# Patient Record
Sex: FEMALE
[De-identification: names, ages, dates, MRNs, and addresses within clinical notes are randomized per-mention and may not be internally consistent; named-entity substitution may affect disease eponyms.]

---

## 2018-09-15 ENCOUNTER — CHARTING TRANS (OUTPATIENT)
Dept: OTHER | Age: 58
End: 2018-09-15

## 2018-09-15 ENCOUNTER — LAB SERVICES (OUTPATIENT)
Dept: OTHER | Age: 58
End: 2018-09-15

## 2018-09-15 LAB
APPEARANCE: CLEAR
BILIRUBIN: NORMAL
COLOR: YELLOW
GLUCOSE U: NORMAL
KETONES: NORMAL
LEUKOCYTE ESTERASE: NORMAL
LEUKOCYTES: NORMAL
NITRITE: NORMAL
OCCULT BLOOD: NORMAL
PH: 5
PROTEIN: 15
URINE SPEC GRAVITY: 1
UROBILINOGEN: 0.2

## 2018-09-20 LAB — BACTERIA UR CULT: NORMAL

## 2018-12-08 VITALS
BODY MASS INDEX: 35.44 KG/M2 | TEMPERATURE: 98.6 F | RESPIRATION RATE: 16 BRPM | HEART RATE: 70 BPM | SYSTOLIC BLOOD PRESSURE: 114 MMHG | DIASTOLIC BLOOD PRESSURE: 70 MMHG | HEIGHT: 63 IN | WEIGHT: 200 LBS

## 2019-07-10 ENCOUNTER — APPOINTMENT (OUTPATIENT)
Dept: GENERAL RADIOLOGY | Age: 59
End: 2019-07-10
Attending: FAMILY MEDICINE
Payer: COMMERCIAL

## 2019-07-10 ENCOUNTER — HOSPITAL ENCOUNTER (OUTPATIENT)
Age: 59
Discharge: HOME | End: 2019-07-10
Attending: FAMILY MEDICINE
Payer: COMMERCIAL

## 2019-07-10 VITALS
WEIGHT: 193 LBS | OXYGEN SATURATION: 100 % | DIASTOLIC BLOOD PRESSURE: 70 MMHG | HEART RATE: 84 BPM | HEIGHT: 64 IN | TEMPERATURE: 98 F | RESPIRATION RATE: 17 BRPM | SYSTOLIC BLOOD PRESSURE: 143 MMHG | BODY MASS INDEX: 32.95 KG/M2

## 2019-07-10 DIAGNOSIS — S80.01XA CONTUSION OF RIGHT KNEE, INITIAL ENCOUNTER: ICD-10-CM

## 2019-07-10 DIAGNOSIS — S63.501A SPRAIN OF RIGHT WRIST, INITIAL ENCOUNTER: Primary | ICD-10-CM

## 2019-07-10 DIAGNOSIS — S60.221A CONTUSION OF RIGHT HAND, INITIAL ENCOUNTER: ICD-10-CM

## 2019-07-10 DIAGNOSIS — S00.511A ABRASION OF LIP, INITIAL ENCOUNTER: ICD-10-CM

## 2019-07-10 DIAGNOSIS — S60.222A CONTUSION OF LEFT HAND, INITIAL ENCOUNTER: ICD-10-CM

## 2019-07-10 DIAGNOSIS — S63.502A SPRAIN OF LEFT WRIST, INITIAL ENCOUNTER: ICD-10-CM

## 2019-07-10 DIAGNOSIS — S80.212A ABRASION OF LEFT KNEE, INITIAL ENCOUNTER: ICD-10-CM

## 2019-07-10 PROCEDURE — 73110 X-RAY EXAM OF WRIST: CPT | Performed by: FAMILY MEDICINE

## 2019-07-10 PROCEDURE — 73130 X-RAY EXAM OF HAND: CPT | Performed by: FAMILY MEDICINE

## 2019-07-10 PROCEDURE — 99204 OFFICE O/P NEW MOD 45 MIN: CPT

## 2019-07-10 PROCEDURE — 73560 X-RAY EXAM OF KNEE 1 OR 2: CPT | Performed by: FAMILY MEDICINE

## 2019-07-10 RX ORDER — LIRAGLUTIDE 6 MG/ML
INJECTION SUBCUTANEOUS
Refills: 3 | COMMUNITY
Start: 2019-05-15

## 2019-07-10 RX ORDER — METFORMIN HYDROCHLORIDE 500 MG/1
TABLET, EXTENDED RELEASE ORAL
Refills: 3 | COMMUNITY
Start: 2019-06-29

## 2019-07-10 RX ORDER — AMLODIPINE BESYLATE 10 MG/1
TABLET ORAL
Refills: 0 | COMMUNITY
Start: 2019-05-09

## 2019-07-10 RX ORDER — INSULIN GLARGINE 100 [IU]/ML
INJECTION, SOLUTION SUBCUTANEOUS
Refills: 2 | COMMUNITY
Start: 2019-06-24

## 2019-07-10 RX ORDER — LOSARTAN POTASSIUM 100 MG/1
TABLET ORAL
Refills: 0 | COMMUNITY
Start: 2019-05-09

## 2019-07-10 RX ORDER — PEN NEEDLE, DIABETIC 31 GX5/16"
NEEDLE, DISPOSABLE MISCELLANEOUS
Refills: 5 | COMMUNITY
Start: 2019-05-14

## 2019-07-10 RX ORDER — ERGOCALCIFEROL 1.25 MG/1
CAPSULE ORAL
Refills: 1 | COMMUNITY
Start: 2019-05-16

## 2019-07-10 NOTE — ED PROVIDER NOTES
Patient Seen in: [address]    History   Patient presents with:  Fall (musculoskeletal, neurologic)    Stated Complaint: Fall, Facial Injury both hands     HPI    59yo F with a PMHx sig for DM2 and HTN presents to IC aft Mouth/Throat: Oropharynx is clear and moist.   Lower lip with superficial abrasion < 2cm. Hemostatic. No foreign bodies or deep structures. Mild swelling. Eyes: Pupils are equal, round, and reactive to light.  Conjunctivae are normal.   Neck: Normal ra Normal sensation noted. Normal strength noted. Left hand: She exhibits tenderness (2,3 metacarpals) and swelling (diffuse).  She exhibits normal range of motion, normal two-point discrimination, normal capillary refill, no deformity and no laceration Prescribed:  Current Discharge Medication List

## 2019-07-10 NOTE — ED INITIAL ASSESSMENT (HPI)
Per pt tripped and fell in the parking lot, reports lip injury and landed on hands and knees. Denies any LOC. Abrasion and swelling to bilateral knee.

## 2020-02-27 ENCOUNTER — OFFICE VISIT (OUTPATIENT)
Dept: FAMILY MEDICINE CLINIC | Facility: CLINIC | Age: 60
End: 2020-02-27
Payer: COMMERCIAL

## 2020-02-27 ENCOUNTER — HOSPITAL ENCOUNTER (OUTPATIENT)
Dept: GENERAL RADIOLOGY | Age: 60
Discharge: HOME | End: 2020-02-27
Attending: FAMILY MEDICINE
Payer: COMMERCIAL

## 2020-02-27 ENCOUNTER — APPOINTMENT (OUTPATIENT)
Dept: LAB | Age: 60
End: 2020-02-27
Attending: FAMILY MEDICINE
Payer: COMMERCIAL

## 2020-02-27 VITALS
BODY MASS INDEX: 33.29 KG/M2 | SYSTOLIC BLOOD PRESSURE: 138 MMHG | HEIGHT: 64 IN | WEIGHT: 195 LBS | RESPIRATION RATE: 17 BRPM | DIASTOLIC BLOOD PRESSURE: 70 MMHG

## 2020-02-27 DIAGNOSIS — R20.9 SKIN SENSATION DISTURBANCE: ICD-10-CM

## 2020-02-27 DIAGNOSIS — E11.9 TYPE 2 DIABETES MELLITUS WITHOUT COMPLICATION, WITHOUT LONG-TERM CURRENT USE OF INSULIN (HCC): ICD-10-CM

## 2020-02-27 DIAGNOSIS — I10 ESSENTIAL HYPERTENSION: ICD-10-CM

## 2020-02-27 DIAGNOSIS — G62.9 PERIPHERAL POLYNEUROPATHY: ICD-10-CM

## 2020-02-27 DIAGNOSIS — Z76.89 ESTABLISHING CARE WITH NEW DOCTOR, ENCOUNTER FOR: Primary | ICD-10-CM

## 2020-02-27 LAB
ALBUMIN SERPL-MCNC: 4 G/DL (ref 3.4–5)
ALBUMIN/GLOB SERPL: 1 {RATIO} (ref 1–2)
ALP LIVER SERPL-CCNC: 96 U/L (ref 46–118)
ALT SERPL-CCNC: 48 U/L (ref 13–56)
ANION GAP SERPL CALC-SCNC: 8 MMOL/L (ref 0–18)
AST SERPL-CCNC: 25 U/L (ref 15–37)
BILIRUB SERPL-MCNC: 0.2 MG/DL (ref 0.1–2)
BUN BLD-MCNC: 9 MG/DL (ref 7–18)
BUN/CREAT SERPL: 14.5 (ref 10–20)
CALCIUM BLD-MCNC: 9.4 MG/DL (ref 8.5–10.1)
CHLORIDE SERPL-SCNC: 104 MMOL/L (ref 98–112)
CHOLEST SMN-MCNC: 191 MG/DL (ref ?–200)
CO2 SERPL-SCNC: 26 MMOL/L (ref 21–32)
CREAT BLD-MCNC: 0.62 MG/DL (ref 0.55–1.02)
EST. AVERAGE GLUCOSE BLD GHB EST-MCNC: 177 MG/DL (ref 68–126)
GLOBULIN PLAS-MCNC: 4.1 G/DL (ref 2.8–4.4)
GLUCOSE BLD-MCNC: 161 MG/DL (ref 70–99)
HBA1C MFR BLD HPLC: 7.8 % (ref ?–5.7)
HDLC SERPL-MCNC: 48 MG/DL (ref 40–59)
LDLC SERPL CALC-MCNC: 86 MG/DL (ref ?–100)
M PROTEIN MFR SERPL ELPH: 8.1 G/DL (ref 6.4–8.2)
NONHDLC SERPL-MCNC: 143 MG/DL (ref ?–130)
OSMOLALITY SERPL CALC.SUM OF ELEC: 288 MOSM/KG (ref 275–295)
PATIENT FASTING Y/N/NP: NO
PATIENT FASTING Y/N/NP: NO
POTASSIUM SERPL-SCNC: 4.1 MMOL/L (ref 3.5–5.1)
PROT UR-MCNC: 16.9 MG/DL
SODIUM SERPL-SCNC: 138 MMOL/L (ref 136–145)
TRIGL SERPL-MCNC: 285 MG/DL (ref 30–149)
VLDLC SERPL CALC-MCNC: 57 MG/DL (ref 0–30)

## 2020-02-27 PROCEDURE — 84156 ASSAY OF PROTEIN URINE: CPT | Performed by: FAMILY MEDICINE

## 2020-02-27 PROCEDURE — 83036 HEMOGLOBIN GLYCOSYLATED A1C: CPT | Performed by: FAMILY MEDICINE

## 2020-02-27 PROCEDURE — 80053 COMPREHEN METABOLIC PANEL: CPT | Performed by: FAMILY MEDICINE

## 2020-02-27 PROCEDURE — 36415 COLL VENOUS BLD VENIPUNCTURE: CPT | Performed by: FAMILY MEDICINE

## 2020-02-27 PROCEDURE — 72050 X-RAY EXAM NECK SPINE 4/5VWS: CPT | Performed by: FAMILY MEDICINE

## 2020-02-27 PROCEDURE — 80061 LIPID PANEL: CPT | Performed by: FAMILY MEDICINE

## 2020-02-27 PROCEDURE — 99204 OFFICE O/P NEW MOD 45 MIN: CPT | Performed by: FAMILY MEDICINE

## 2020-02-27 NOTE — PATIENT INSTRUCTIONS
We will redraw the patient's A1c on today. Medication reviewed and renewed where needed and appropriate. Comply with medications. Monitor blood pressures and record at home. Limit salt intake.   Recommend weight loss via daily exercising and consistent h

## 2020-02-28 NOTE — PROGRESS NOTES
HPI:    Patient ID: William Saunders is a 63year old female.     This patient is a 52-year-old -American female who presents to our clinic to establish care with a history of diabetes currently on a 3 medical regimen for treatment as well as being tr Effort normal and breath sounds normal. No respiratory distress. Neurological: She is alert and oriented to person, place, and time. No cranial nerve deficit or motor deficit. ASSESSMENT/PLAN:   1.  Establishing care with new doctor, vani #[id number]

## 2020-04-20 ENCOUNTER — PATIENT MESSAGE (OUTPATIENT)
Dept: FAMILY MEDICINE CLINIC | Facility: CLINIC | Age: 60
End: 2020-04-20

## 2020-04-20 DIAGNOSIS — M48.02 FORAMINAL STENOSIS OF CERVICAL REGION: Primary | ICD-10-CM

## 2020-04-21 NOTE — TELEPHONE ENCOUNTER
From: Flaca England  To: Kathy Amaya DO  Sent: 4/20/2020 7:08 PM CDT  Subject: Test Results Question    Dr. Geronimo Archibald,    Based on the results from the x-rays showing stenosis is that the cause the numbness in my extremities.  It has gotten progre

## 2020-04-21 NOTE — TELEPHONE ENCOUNTER
This is likely the reason for your symptoms. You are going to need to see a physiatry specialist.  A referral is on the chart.

## 2020-07-01 ENCOUNTER — TELEMEDICINE (OUTPATIENT)
Dept: NEUROLOGY | Facility: CLINIC | Age: 60
End: 2020-07-01
Payer: COMMERCIAL

## 2020-07-01 ENCOUNTER — TELEPHONE (OUTPATIENT)
Dept: NEUROLOGY | Facility: CLINIC | Age: 60
End: 2020-07-01

## 2020-07-01 DIAGNOSIS — G47.9 SLEEP DISTURBANCE: ICD-10-CM

## 2020-07-01 DIAGNOSIS — E11.9 TYPE 2 DIABETES MELLITUS WITHOUT COMPLICATION, UNSPECIFIED WHETHER LONG TERM INSULIN USE (HCC): ICD-10-CM

## 2020-07-01 DIAGNOSIS — R20.0 NUMBNESS AND TINGLING IN BOTH HANDS: ICD-10-CM

## 2020-07-01 DIAGNOSIS — R29.3 POOR POSTURE: ICD-10-CM

## 2020-07-01 DIAGNOSIS — M54.12 CERVICAL RADICULOPATHY: Primary | ICD-10-CM

## 2020-07-01 DIAGNOSIS — R29.898 BILATERAL ARM WEAKNESS: ICD-10-CM

## 2020-07-01 DIAGNOSIS — R20.2 NUMBNESS AND TINGLING IN BOTH HANDS: ICD-10-CM

## 2020-07-01 PROCEDURE — 99244 OFF/OP CNSLTJ NEW/EST MOD 40: CPT | Performed by: PHYSICAL MEDICINE & REHABILITATION

## 2020-07-01 RX ORDER — GABAPENTIN 300 MG/1
CAPSULE ORAL
Qty: 90 CAPSULE | Refills: 0 | Status: SHIPPED | OUTPATIENT
Start: 2020-07-01 | End: 2020-08-04

## 2020-07-01 RX ORDER — GABAPENTIN 300 MG/1
CAPSULE ORAL
Qty: 270 CAPSULE | Refills: 0 | OUTPATIENT
Start: 2020-07-01

## 2020-07-01 RX ORDER — MELOXICAM 15 MG/1
15 TABLET ORAL DAILY
Qty: 14 TABLET | Refills: 0 | Status: SHIPPED | OUTPATIENT
Start: 2020-07-01 | End: 2020-08-12

## 2020-07-01 NOTE — TELEPHONE ENCOUNTER
[employer] for authorization of approval for MRI C-spine wo cpt code 24499. Approval was given with Authorization Number: [id number] effective 07/01/20 to 12/28/20. MRI is scheduled on 07/07/20.

## 2020-07-01 NOTE — PROGRESS NOTES
[address]    Telemedicine Visit - New Evaluation    Telehealth outside of [address] Verbal Consent   I conducted a telehealth visit with Adela Mcgovern today, 07/01/20, which was completed using being evaluated for bilateral arm and hand pain as well as numbness tingling sensation bilateral arms and legs. Patient denies any recent injury or trauma.   She has been experiencing the symptoms since February which has exacerbated her symptoms since the Medication Sig Dispense Refill   • gabapentin 300 MG Oral Cap Start with night time dose only. If well tolerated, increase to two times daily. If well tolerated, increase to three times daily.  90 capsule 0   • Meloxicam (MOBIC) 15 MG Oral Tab Take 1 tabl Atraumatic  Eyes: Extra-occular movements intact  Ears/Nose/Throat:  External appearance identifies normal appearance without obvious deformity  Cardiovascular: No cyanosis, clubbing or edema  Respiratory: Non-labored respirations  Skin: No lesions noted flexors)  • Sensation – please run your hand down the other arm, then do the other side. Any numb spots? How about in your hand? • Spurling's – please turn your head to the side then look back – any shooting pain down the arm or into the shoulder? strength but has some weakness with  strength in the left hand. She has a positive Tinel's in the left hand as well.   Given these findings in the bilateral upper and lower extremities, I would like to obtain MRI imaging of the cervical spine for stevo answered. Duration of the service: 16 minutes    Dionne PEARCE [facility]  Physical Medicine and Rehabilitation/Sports Medicine

## 2020-07-07 ENCOUNTER — HOSPITAL ENCOUNTER (OUTPATIENT)
Dept: MRI IMAGING | Facility: HOSPITAL | Age: 60
Discharge: HOME | End: 2020-07-07
Attending: PHYSICAL MEDICINE & REHABILITATION
Payer: COMMERCIAL

## 2020-07-07 DIAGNOSIS — R20.0 NUMBNESS AND TINGLING IN BOTH HANDS: ICD-10-CM

## 2020-07-07 DIAGNOSIS — R20.2 NUMBNESS AND TINGLING IN BOTH HANDS: ICD-10-CM

## 2020-07-07 DIAGNOSIS — M54.12 CERVICAL RADICULOPATHY: ICD-10-CM

## 2020-07-07 DIAGNOSIS — R29.898 BILATERAL ARM WEAKNESS: ICD-10-CM

## 2020-07-07 PROCEDURE — 72141 MRI NECK SPINE W/O DYE: CPT | Performed by: PHYSICAL MEDICINE & REHABILITATION

## 2020-07-08 ENCOUNTER — TELEMEDICINE (OUTPATIENT)
Dept: NEUROLOGY | Facility: CLINIC | Age: 60
End: 2020-07-08

## 2020-07-08 ENCOUNTER — TELEPHONE (OUTPATIENT)
Dept: NEUROLOGY | Facility: CLINIC | Age: 60
End: 2020-07-08

## 2020-07-08 DIAGNOSIS — G95.9 CERVICAL MYELOPATHY WITH CERVICAL RADICULOPATHY (HCC): Primary | ICD-10-CM

## 2020-07-08 DIAGNOSIS — R20.2 NUMBNESS AND TINGLING IN BOTH HANDS: ICD-10-CM

## 2020-07-08 DIAGNOSIS — M48.02 CERVICAL SPINAL STENOSIS: ICD-10-CM

## 2020-07-08 DIAGNOSIS — E11.9 TYPE 2 DIABETES MELLITUS WITHOUT COMPLICATION, UNSPECIFIED WHETHER LONG TERM INSULIN USE (HCC): ICD-10-CM

## 2020-07-08 DIAGNOSIS — M50.00 HNP (HERNIATED NUCLEUS PULPOSUS) WITH MYELOPATHY, CERVICAL: ICD-10-CM

## 2020-07-08 DIAGNOSIS — R20.0 NUMBNESS AND TINGLING IN BOTH HANDS: ICD-10-CM

## 2020-07-08 DIAGNOSIS — R29.898 BILATERAL ARM WEAKNESS: ICD-10-CM

## 2020-07-08 DIAGNOSIS — M54.12 CERVICAL MYELOPATHY WITH CERVICAL RADICULOPATHY (HCC): Primary | ICD-10-CM

## 2020-07-08 DIAGNOSIS — G56.02 CARPAL TUNNEL SYNDROME OF LEFT WRIST: ICD-10-CM

## 2020-07-08 PROCEDURE — 99214 OFFICE O/P EST MOD 30 MIN: CPT | Performed by: PHYSICAL MEDICINE & REHABILITATION

## 2020-07-09 PROBLEM — R20.2 NUMBNESS AND TINGLING IN BOTH HANDS: Status: ACTIVE | Noted: 2020-07-09

## 2020-07-09 PROBLEM — M50.00 HNP (HERNIATED NUCLEUS PULPOSUS) WITH MYELOPATHY, CERVICAL: Status: ACTIVE | Noted: 2020-07-09

## 2020-07-09 PROBLEM — R20.0 NUMBNESS AND TINGLING IN BOTH HANDS: Status: ACTIVE | Noted: 2020-07-09

## 2020-07-09 PROBLEM — G95.9 CERVICAL MYELOPATHY WITH CERVICAL RADICULOPATHY: Status: ACTIVE | Noted: 2020-07-09

## 2020-07-09 PROBLEM — G95.9 CERVICAL MYELOPATHY WITH CERVICAL RADICULOPATHY (HCC): Status: ACTIVE | Noted: 2020-07-09

## 2020-07-09 PROBLEM — M54.12 CERVICAL MYELOPATHY WITH CERVICAL RADICULOPATHY  (HCC): Status: ACTIVE | Noted: 2020-07-09

## 2020-07-09 PROBLEM — M48.02 CERVICAL SPINAL STENOSIS: Status: ACTIVE | Noted: 2020-07-09

## 2020-07-09 PROBLEM — M54.12 CERVICAL MYELOPATHY WITH CERVICAL RADICULOPATHY: Status: ACTIVE | Noted: 2020-07-09

## 2020-07-09 PROBLEM — G95.9 CERVICAL MYELOPATHY WITH CERVICAL RADICULOPATHY  (HCC): Status: ACTIVE | Noted: 2020-07-09

## 2020-07-09 PROBLEM — E11.9 TYPE 2 DIABETES MELLITUS WITHOUT COMPLICATION (HCC): Status: ACTIVE | Noted: 2020-07-09

## 2020-07-09 PROBLEM — G56.02 CARPAL TUNNEL SYNDROME OF LEFT WRIST: Status: ACTIVE | Noted: 2020-07-09

## 2020-07-09 PROBLEM — R29.898 BILATERAL ARM WEAKNESS: Status: ACTIVE | Noted: 2020-07-09

## 2020-07-09 PROBLEM — M54.12 CERVICAL MYELOPATHY WITH CERVICAL RADICULOPATHY (HCC): Status: ACTIVE | Noted: 2020-07-09

## 2020-07-09 NOTE — PROGRESS NOTES
[address]    Telemedicine Visit - New Evaluation    Telehealth outside of [address] Verbal Consent   I conducted a telehealth visit with Marilyn Horan today, 07/08/20, which was completed usin cervical spine which is noted below. MRI is notable for multilevel herniated cervical discs with the worst at C5-6 which is causing cord impingement and signal changes in the spinal cord suggestive of cervical myelopathy.   There is severe central narrowin extremities worse on the left lower extremity. She had a fall which she describes as a mechanical fall however she thinks it was related to the imbalance. She denies any loss of bowel bladder control though does have some urine urgency.   She denies any f 1 T PO D  0   • metFORMIN HCl  MG Oral Tablet 24 Hr TK 2 TS PO BID  3   • ergocalciferol 38669 units Oral Cap TK 1 C PO Q WK  1         ALLERGIES:     Sulfa Antibiotics       ANAPHYLAXIS    Comment:[facility] Allergy Text Annotation: sulfa drugs      FAM strength in the left hand compared to the right   sensation: Self-assessment to crude touch is intact in bilateral upper extremities   Spurling's test: negative for radicular pain symptoms   Gait Normal   Tinel's at the left wrist is positive    Patient wa GFRNAA 100 02/27/2020    GFRAA 115 02/27/2020    CA 9.4 02/27/2020    OSMOCALC 288 02/27/2020    ALKPHO 96 02/27/2020    AST 25 02/27/2020    ALT 48 02/27/2020    BILT 0.2 02/27/2020    TP 8.1 02/27/2020    ALB 4.0 02/27/2020    GLOBULIN 4.1 02/27/2020 spine was reviewed which is notable for multilevel degenerative disc disease and spondylosis and disc herniations at C3-4, C4-5 and C5-6 and multilevel spinal stenosis which is most severe at C5-6 with 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 The patient verbalized understanding with this plan and was in agreement. There are no barriers to learning. All questions were answered. Duration of the service: 24 minutes    Felix Lozoya

## 2020-07-15 ENCOUNTER — TELEPHONE (OUTPATIENT)
Dept: NEUROLOGY | Facility: CLINIC | Age: 60
End: 2020-07-15

## 2020-07-15 ENCOUNTER — PROCEDURE VISIT (OUTPATIENT)
Dept: NEUROLOGY | Facility: CLINIC | Age: 60
End: 2020-07-15
Payer: COMMERCIAL

## 2020-07-15 DIAGNOSIS — R20.2 NUMBNESS AND TINGLING IN BOTH HANDS: Primary | ICD-10-CM

## 2020-07-15 DIAGNOSIS — R20.0 NUMBNESS AND TINGLING IN BOTH HANDS: Primary | ICD-10-CM

## 2020-07-15 PROCEDURE — 95911 NRV CNDJ TEST 9-10 STUDIES: CPT | Performed by: PHYSICAL MEDICINE & REHABILITATION

## 2020-07-15 PROCEDURE — 95886 MUSC TEST DONE W/N TEST COMP: CPT | Performed by: PHYSICAL MEDICINE & REHABILITATION

## 2020-07-17 NOTE — PROCEDURES
[address]   Nerve Conduction Study and Electromyography Procedure Note    Patient: rosenda torres Hand Dominance:  right   Patient ID: [id number] Referring Dr:  Scott Madera   Sex: Female Test Dr:  amanda   Date of L. Flexor carpi radialis Median C6-C7 N 2+ 1+ None None Normal N N N Reduced   L. Flexor carpi ulnaris Ulnar C7-T1 N 1+ None None None Normal N N N Reduced   L. Abductor pollicis brevis Median X8-L3 N None None None None Normal N N N N   L.  First dorsal in o the peak amplitude was reduced for Wrist stimulation  ? In the L Radial - Wrist study  o the peak amplitude was reduced for Forearm stimulation      Conclusion: This is an ABNORMAL study.     1. There is electrodiagnostic evidence of a bilateral median ne

## 2020-08-04 NOTE — TELEPHONE ENCOUNTER
Spoke to patient, state that she is up to 2 capsules daily. Expressed that she is working up to the 3 times daily. Medication request:Gabapentin 300 mg oral cap. Start with night time dose only. If tolerated increase to two times  Daily.  If well tolera

## 2020-08-05 RX ORDER — GABAPENTIN 300 MG/1
CAPSULE ORAL
Qty: 90 CAPSULE | Refills: 0 | Status: SHIPPED | OUTPATIENT
Start: 2020-08-14 | End: 2020-09-10

## 2020-08-11 ENCOUNTER — TELEPHONE (OUTPATIENT)
Dept: FAMILY MEDICINE CLINIC | Facility: CLINIC | Age: 60
End: 2020-08-11

## 2020-08-11 ENCOUNTER — PATIENT MESSAGE (OUTPATIENT)
Dept: FAMILY MEDICINE CLINIC | Facility: CLINIC | Age: 60
End: 2020-08-11

## 2020-08-11 NOTE — TELEPHONE ENCOUNTER
----- Message from Anna Stoddard sent at 8/11/2020  4:08 PM CDT -----  Regarding: Non-Urgent Medical Question  Contact: [phone]  Dr. Radha Stapleton,   I went to Dr Edyta Felix per your referral and was diagnosed with carpel tunnel syndrome in addition to art

## 2020-08-11 NOTE — TELEPHONE ENCOUNTER
From: Brandy England  To:  Jen Ellis DO  Sent: 8/11/2020 4:08 PM CDT  Subject: Non-Urgent Medical Question    Dr. Abida Laboy,   I went to Dr aGetano Samson per your referral and was diagnosed with carpel tunnel syndrome in addition to arthritis in my fin

## 2020-08-11 NOTE — TELEPHONE ENCOUNTER
Jaqueline Robert: Patient on gabapentin. She would like medication she can take for pain/stiffness. Left Hand/fingers painful, unable to close ring and 4th finger due to arthritic type pain. Pain rated 8/10.

## 2020-08-12 RX ORDER — MELOXICAM 15 MG/1
15 TABLET ORAL DAILY
Qty: 14 TABLET | Refills: 0 | Status: SHIPPED | OUTPATIENT
Start: 2020-08-12 | End: 2020-08-26

## 2020-08-12 NOTE — TELEPHONE ENCOUNTER
It looks like she was prescribed meloxicam 15 mg on 7/1/2020. I recommend taking this every day along with gabapentin. If this is not effective then recommend she contact Dr. Kathryn Clemente for additional treatment.

## 2020-08-12 NOTE — TELEPHONE ENCOUNTER
Spoke with pt and MD message below given. Pt verb understanding. Pt states she is out of meloxicam.  Pt asking for refill. Please advise, last prescribed by Dr Bridgett Ayala.

## 2020-09-04 ENCOUNTER — TELEPHONE (OUTPATIENT)
Dept: FAMILY MEDICINE CLINIC | Facility: CLINIC | Age: 60
End: 2020-09-04

## 2020-09-04 DIAGNOSIS — M79.646 PAIN OF FINGER, UNSPECIFIED LATERALITY: Primary | ICD-10-CM

## 2020-09-04 DIAGNOSIS — Z79.4 TYPE 2 DIABETES MELLITUS WITHOUT COMPLICATION, WITH LONG-TERM CURRENT USE OF INSULIN (HCC): ICD-10-CM

## 2020-09-04 DIAGNOSIS — E11.9 TYPE 2 DIABETES MELLITUS WITHOUT COMPLICATION, WITHOUT LONG-TERM CURRENT USE OF INSULIN (HCC): ICD-10-CM

## 2020-09-04 DIAGNOSIS — E11.9 TYPE 2 DIABETES MELLITUS WITHOUT COMPLICATION, WITH LONG-TERM CURRENT USE OF INSULIN (HCC): ICD-10-CM

## 2020-09-04 NOTE — TELEPHONE ENCOUNTER
Also sent to Dr. Robert Mosqueda (on call)--please see below and advise    Please reply to pool: ANGELA Mann

## 2020-09-04 NOTE — TELEPHONE ENCOUNTER
Spoke with patient ( verified)-- reports persistent pain 8/10 on her two arthritic fingers, despite completing 2 rounds of Meloxicam.    Patient asking PCP for recommendations/next steps.     Pharmacy verified    Patient also requesting order and approva

## 2020-09-05 RX ORDER — BLOOD-GLUCOSE SENSOR
EACH MISCELLANEOUS
Refills: 0 | Status: CANCELLED | OUTPATIENT
Start: 2020-09-05

## 2020-09-05 NOTE — TELEPHONE ENCOUNTER
I did a referral to our Hand Specialist- Dr. Suzzanna Severin. He may be able to help her. What is Dexcom? I am not familiar with this?

## 2020-09-05 NOTE — TELEPHONE ENCOUNTER
Spoke with pt and Dr Kiran Miller message given. Pt verb understanding and agrees to plan. Pt given contact information for Dr Lexy Root.      Pt states Dexcom 6 is similar to Goel freestyle and enables her to check blood sugar without multiple finger sti

## 2020-09-05 NOTE — TELEPHONE ENCOUNTER
OK great. I am going to send the order for Dexcom to [facility] as he sees him for diabetes.

## 2020-09-07 RX ORDER — BLOOD-GLUCOSE SENSOR
1 EACH MISCELLANEOUS 4 TIMES DAILY PRN
Qty: 3 EACH | Refills: 11 | Status: SHIPPED | OUTPATIENT
Start: 2020-09-07

## 2020-09-07 RX ORDER — BLOOD-GLUCOSE,RECEIVER,CONT
1 EACH MISCELLANEOUS 4 TIMES DAILY PRN
Qty: 1 DEVICE | Refills: 0 | Status: SHIPPED | OUTPATIENT
Start: 2020-09-07

## 2020-09-08 RX ORDER — GABAPENTIN 300 MG/1
CAPSULE ORAL
Qty: 90 CAPSULE | Refills: 0 | OUTPATIENT
Start: 2020-09-08

## 2020-09-08 NOTE — TELEPHONE ENCOUNTER
Spoke with patient (verified name and ), advised Dr Dalton Martinez note and verbalized understanding. Note      Device and sensors sent to the pharmacy; please call the patient and let her know.

## 2020-09-09 ENCOUNTER — PATIENT MESSAGE (OUTPATIENT)
Dept: FAMILY MEDICINE CLINIC | Facility: CLINIC | Age: 60
End: 2020-09-09

## 2020-09-09 NOTE — TELEPHONE ENCOUNTER
From: Yue England  To: Parul Calloway DO  Sent: 9/9/2020 10:00 AM CDT  Subject: Prescription Question    Dr. Timi Mercer prescribed the gabapentin 300mg and it was denied. Did you discontinue it or was there a mix up somewhere?

## 2020-09-09 NOTE — TELEPHONE ENCOUNTER
Routed to Dr Arben Natarajan for advise, thanks. Requested Prescriptions     Pending Prescriptions Disp Refills   • gabapentin 300 MG Oral Cap 90 capsule 0     Sig: TAKE 1 CAPSULES BY MOUTH EVERY NIGHT AT BEDTIME, INCREASE TO TWICE DAILY AS TOLERATED.  INCREASE

## 2020-09-10 RX ORDER — GABAPENTIN 300 MG/1
CAPSULE ORAL
Qty: 90 CAPSULE | Refills: 0 | Status: SHIPPED | OUTPATIENT
Start: 2020-09-10 | End: 2020-12-31

## 2020-09-24 ENCOUNTER — OFFICE VISIT (OUTPATIENT)
Dept: SURGERY | Facility: CLINIC | Age: 60
End: 2020-09-24
Payer: COMMERCIAL

## 2020-09-24 DIAGNOSIS — M19.041 PRIMARY OSTEOARTHRITIS OF RIGHT HAND: ICD-10-CM

## 2020-09-24 DIAGNOSIS — G56.03 BILATERAL CARPAL TUNNEL SYNDROME: Primary | ICD-10-CM

## 2020-09-24 DIAGNOSIS — G56.22 ULNAR NEUROPATHY OF LEFT UPPER EXTREMITY: ICD-10-CM

## 2020-09-24 DIAGNOSIS — M19.042 PRIMARY OSTEOARTHRITIS OF LEFT HAND: ICD-10-CM

## 2020-09-24 PROCEDURE — 99243 OFF/OP CNSLTJ NEW/EST LOW 30: CPT | Performed by: PLASTIC SURGERY

## 2020-09-24 NOTE — H&P
Meggan Joseph is a 63year old female that presents with Patient presents with:  Carpal Tunnel Syndrome: bilateral  .    REFERRED BY:  Xiao Gray     Pacemaker: No  Latex Allergy: no  Coumadin: No  Plavix: No  Other anticoagulants: No  Cardiac the joints. Rates pain 8/10. Taking Ibuprofen prn, helpful. Night symptoms:  Yes , several times a week  Functional problems: Yes, drops things, driving,gettting dressed and typing    Previous therapy:  Yes, splints,ineffective. family history.        PHYSICAL EXAM:     CONSTITUTIONAL: Overall appearance - Normal  HEENT: Normocephalic  EYES: Conjunctiva - Right: Normal, Left: Normal; EOMI  EARS: Inspection - Right: Normal, Left: Normal  NECK/THYROID: Inspection - Normal, Palpation but she has cervical disc disease which needs to be worked up and treated prior to reassessment of the left ulnar nerve compression. Arthritis RMF, RRF, RSF PIP    We discussed what arthritis is, including treatment options.       I would recommend s

## 2020-12-31 RX ORDER — GABAPENTIN 300 MG/1
CAPSULE ORAL
Qty: 90 CAPSULE | Refills: 0 | Status: SHIPPED | OUTPATIENT
Start: 2020-12-31

## 2021-06-02 ENCOUNTER — TELEPHONE (OUTPATIENT)
Dept: FAMILY MEDICINE CLINIC | Facility: CLINIC | Age: 61
End: 2021-06-02

## 2021-06-02 NOTE — TELEPHONE ENCOUNTER
Left message to call back. Please assist pt with scheduling her annual physical (also needs mammogram).

## 2021-09-21 ENCOUNTER — HOSPITAL ENCOUNTER (OUTPATIENT)
Age: 61
Discharge: HOME | End: 2021-09-21
Payer: COMMERCIAL

## 2021-09-21 VITALS
DIASTOLIC BLOOD PRESSURE: 67 MMHG | OXYGEN SATURATION: 98 % | TEMPERATURE: 97 F | SYSTOLIC BLOOD PRESSURE: 125 MMHG | HEART RATE: 80 BPM | RESPIRATION RATE: 18 BRPM

## 2021-09-21 DIAGNOSIS — J02.0 STREPTOCOCCAL SORE THROAT: Primary | ICD-10-CM

## 2021-09-21 LAB — S PYO AG THROAT QL: POSITIVE

## 2021-09-21 PROCEDURE — 87880 STREP A ASSAY W/OPTIC: CPT | Performed by: NURSE PRACTITIONER

## 2021-09-21 PROCEDURE — 99213 OFFICE O/P EST LOW 20 MIN: CPT | Performed by: NURSE PRACTITIONER

## 2021-09-21 RX ORDER — PENICILLIN V POTASSIUM 500 MG/1
500 TABLET ORAL 2 TIMES DAILY
Qty: 20 TABLET | Refills: 0 | Status: SHIPPED | OUTPATIENT
Start: 2021-09-21 | End: 2021-10-01

## 2021-09-21 NOTE — ED INITIAL ASSESSMENT (HPI)
Pt states daughter tested pos for strep yesterday. Pt states having headache and a sore throat that began yesterday. Pt denies fever or NVD.

## 2021-09-21 NOTE — ED PROVIDER NOTES
Patient Seen in: [facility]      History   Patient presents with:  Sore Throat    Stated Complaint: SORE THROAT HEADACHE    Subjective:   Well-appearing 41-year-old female presents with complaints of a sore throat, intermittent headaches and Pink.      Mouth: Mucous membranes are moist.      Pharynx: Uvula midline. Posterior oropharyngeal erythema present. No pharyngeal swelling, oropharyngeal exudate or uvula swelling. Tonsils: No tonsillar exudate or tonsillar abscesses.  1+ on the right 2 (two) times a day for 10 days. , Normal, Disp-20 tablet, R-0

## 2022-04-18 ENCOUNTER — APPOINTMENT (OUTPATIENT)
Dept: CT IMAGING | Facility: HOSPITAL | Age: 62
End: 2022-04-18
Attending: NURSE PRACTITIONER
Payer: COMMERCIAL

## 2022-04-18 ENCOUNTER — HOSPITAL ENCOUNTER (OUTPATIENT)
Age: 62
Discharge: OTHER INSTITUTION | End: 2022-04-18
Payer: COMMERCIAL

## 2022-04-18 ENCOUNTER — NURSE TRIAGE (OUTPATIENT)
Dept: FAMILY MEDICINE CLINIC | Facility: CLINIC | Age: 62
End: 2022-04-18

## 2022-04-18 ENCOUNTER — HOSPITAL ENCOUNTER (EMERGENCY)
Facility: HOSPITAL | Age: 62
Discharge: HOME | End: 2022-04-18
Payer: COMMERCIAL

## 2022-04-18 VITALS
OXYGEN SATURATION: 100 % | DIASTOLIC BLOOD PRESSURE: 67 MMHG | RESPIRATION RATE: 20 BRPM | TEMPERATURE: 98 F | HEART RATE: 80 BPM | SYSTOLIC BLOOD PRESSURE: 141 MMHG

## 2022-04-18 VITALS
SYSTOLIC BLOOD PRESSURE: 148 MMHG | BODY MASS INDEX: 31 KG/M2 | HEART RATE: 80 BPM | DIASTOLIC BLOOD PRESSURE: 84 MMHG | TEMPERATURE: 98 F | WEIGHT: 178 LBS | RESPIRATION RATE: 18 BRPM | OXYGEN SATURATION: 100 %

## 2022-04-18 DIAGNOSIS — S09.90XA INJURY OF HEAD, INITIAL ENCOUNTER: Primary | ICD-10-CM

## 2022-04-18 PROCEDURE — 70450 CT HEAD/BRAIN W/O DYE: CPT | Performed by: NURSE PRACTITIONER

## 2022-04-18 PROCEDURE — 72125 CT NECK SPINE W/O DYE: CPT | Performed by: NURSE PRACTITIONER

## 2022-04-18 PROCEDURE — 99205 OFFICE O/P NEW HI 60 MIN: CPT | Performed by: NURSE PRACTITIONER

## 2022-04-18 PROCEDURE — 99284 EMERGENCY DEPT VISIT MOD MDM: CPT

## 2022-04-18 RX ORDER — IBUPROFEN 600 MG/1
600 TABLET ORAL ONCE
Status: COMPLETED | OUTPATIENT
Start: 2022-04-18 | End: 2022-04-18

## 2022-04-18 RX ORDER — CYCLOBENZAPRINE HCL 10 MG
10 TABLET ORAL ONCE
Status: COMPLETED | OUTPATIENT
Start: 2022-04-18 | End: 2022-04-18

## 2022-04-18 RX ORDER — METHOCARBAMOL 500 MG/1
500 TABLET, FILM COATED ORAL 3 TIMES DAILY
Qty: 10 TABLET | Refills: 0 | Status: SHIPPED | OUTPATIENT
Start: 2022-04-18

## 2022-04-18 RX ORDER — IBUPROFEN 600 MG/1
600 TABLET ORAL EVERY 8 HOURS PRN
Qty: 15 TABLET | Refills: 0 | Status: SHIPPED | OUTPATIENT
Start: 2022-04-18 | End: 2022-04-25

## 2022-04-18 NOTE — ED QUICK NOTES
Pt instructed by provider to go to the ER for Head CT due to fall injury , pt states she will talk to her  to determine which ER to go to

## 2022-04-18 NOTE — ED INITIAL ASSESSMENT (HPI)
Pt with complaint of head and neck pain , pt states she slipped on a wet floor yesterday fell backwards and landed on her back, pt denies any loc or dizziness

## 2022-04-18 NOTE — ED INITIAL ASSESSMENT (HPI)
Patient slipped and fell on floor around noon yesterday. +head injury without LOC. Now complaining pain to posterior head, neck, back, \"all over. \" Denies blood thinners.

## 2022-07-31 ENCOUNTER — HOSPITAL ENCOUNTER (EMERGENCY)
Facility: HOSPITAL | Age: 62
Discharge: HOME | End: 2022-07-31
Attending: STUDENT IN AN ORGANIZED HEALTH CARE EDUCATION/TRAINING PROGRAM
Payer: COMMERCIAL

## 2022-07-31 ENCOUNTER — APPOINTMENT (OUTPATIENT)
Dept: MRI IMAGING | Facility: HOSPITAL | Age: 62
End: 2022-07-31
Attending: STUDENT IN AN ORGANIZED HEALTH CARE EDUCATION/TRAINING PROGRAM
Payer: COMMERCIAL

## 2022-07-31 VITALS
HEIGHT: 64 IN | RESPIRATION RATE: 18 BRPM | HEART RATE: 73 BPM | DIASTOLIC BLOOD PRESSURE: 76 MMHG | SYSTOLIC BLOOD PRESSURE: 156 MMHG | WEIGHT: 178 LBS | BODY MASS INDEX: 30.39 KG/M2 | OXYGEN SATURATION: 96 % | TEMPERATURE: 98 F

## 2022-07-31 DIAGNOSIS — H81.399 PERIPHERAL VERTIGO, UNSPECIFIED LATERALITY: Primary | ICD-10-CM

## 2022-07-31 LAB
ANION GAP SERPL CALC-SCNC: 6 MMOL/L (ref 0–18)
BASOPHILS # BLD AUTO: 0.06 X10(3) UL (ref 0–0.2)
BASOPHILS NFR BLD AUTO: 0.8 %
BUN BLD-MCNC: 10 MG/DL (ref 7–18)
BUN/CREAT SERPL: 20 (ref 10–20)
CALCIUM BLD-MCNC: 9.2 MG/DL (ref 8.5–10.1)
CHLORIDE SERPL-SCNC: 106 MMOL/L (ref 98–112)
CO2 SERPL-SCNC: 28 MMOL/L (ref 21–32)
CREAT BLD-MCNC: 0.5 MG/DL
DEPRECATED RDW RBC AUTO: 46.9 FL (ref 35.1–46.3)
EOSINOPHIL # BLD AUTO: 0.17 X10(3) UL (ref 0–0.7)
EOSINOPHIL NFR BLD AUTO: 2.2 %
ERYTHROCYTE [DISTWIDTH] IN BLOOD BY AUTOMATED COUNT: 15.4 % (ref 11–15)
GLUCOSE BLD-MCNC: 103 MG/DL (ref 70–99)
HCT VFR BLD AUTO: 41.2 %
HGB BLD-MCNC: 12.8 G/DL
IMM GRANULOCYTES # BLD AUTO: 0.02 X10(3) UL (ref 0–1)
IMM GRANULOCYTES NFR BLD: 0.3 %
LYMPHOCYTES # BLD AUTO: 3.24 X10(3) UL (ref 1–4)
LYMPHOCYTES NFR BLD AUTO: 42.7 %
MCH RBC QN AUTO: 25.8 PG (ref 26–34)
MCHC RBC AUTO-ENTMCNC: 31.1 G/DL (ref 31–37)
MCV RBC AUTO: 83.1 FL
MONOCYTES # BLD AUTO: 0.73 X10(3) UL (ref 0.1–1)
MONOCYTES NFR BLD AUTO: 9.6 %
NEUTROPHILS # BLD AUTO: 3.36 X10 (3) UL (ref 1.5–7.7)
NEUTROPHILS # BLD AUTO: 3.36 X10(3) UL (ref 1.5–7.7)
NEUTROPHILS NFR BLD AUTO: 44.4 %
OSMOLALITY SERPL CALC.SUM OF ELEC: 289 MOSM/KG (ref 275–295)
PLATELET # BLD AUTO: 303 10(3)UL (ref 150–450)
POTASSIUM SERPL-SCNC: 3.8 MMOL/L (ref 3.5–5.1)
RBC # BLD AUTO: 4.96 X10(6)UL
SODIUM SERPL-SCNC: 140 MMOL/L (ref 136–145)
TROPONIN I HIGH SENSITIVITY: 4 NG/L
WBC # BLD AUTO: 7.6 X10(3) UL (ref 4–11)

## 2022-07-31 PROCEDURE — 96360 HYDRATION IV INFUSION INIT: CPT

## 2022-07-31 PROCEDURE — 84484 ASSAY OF TROPONIN QUANT: CPT | Performed by: STUDENT IN AN ORGANIZED HEALTH CARE EDUCATION/TRAINING PROGRAM

## 2022-07-31 PROCEDURE — 70551 MRI BRAIN STEM W/O DYE: CPT | Performed by: STUDENT IN AN ORGANIZED HEALTH CARE EDUCATION/TRAINING PROGRAM

## 2022-07-31 PROCEDURE — 93005 ELECTROCARDIOGRAM TRACING: CPT

## 2022-07-31 PROCEDURE — 80048 BASIC METABOLIC PNL TOTAL CA: CPT | Performed by: STUDENT IN AN ORGANIZED HEALTH CARE EDUCATION/TRAINING PROGRAM

## 2022-07-31 PROCEDURE — 99285 EMERGENCY DEPT VISIT HI MDM: CPT

## 2022-07-31 PROCEDURE — 99284 EMERGENCY DEPT VISIT MOD MDM: CPT

## 2022-07-31 PROCEDURE — 93010 ELECTROCARDIOGRAM REPORT: CPT | Performed by: STUDENT IN AN ORGANIZED HEALTH CARE EDUCATION/TRAINING PROGRAM

## 2022-07-31 PROCEDURE — 85025 COMPLETE CBC W/AUTO DIFF WBC: CPT | Performed by: STUDENT IN AN ORGANIZED HEALTH CARE EDUCATION/TRAINING PROGRAM

## 2022-07-31 RX ORDER — MECLIZINE HYDROCHLORIDE 25 MG/1
25 TABLET ORAL ONCE
Status: COMPLETED | OUTPATIENT
Start: 2022-07-31 | End: 2022-07-31

## 2022-07-31 RX ORDER — MECLIZINE HYDROCHLORIDE 25 MG/1
25 TABLET ORAL 3 TIMES DAILY PRN
Qty: 30 TABLET | Refills: 0 | Status: SHIPPED | OUTPATIENT
Start: 2022-07-31

## 2022-08-01 ENCOUNTER — TELEPHONE (OUTPATIENT)
Dept: FAMILY MEDICINE CLINIC | Facility: CLINIC | Age: 62
End: 2022-08-01

## 2022-08-01 NOTE — TELEPHONE ENCOUNTER
Spoke with patient (name and  of patient verified). Patient states she was seen in the ER yesterday for dizziness and a headache. Patient states she her symptoms have improved. She is calling to schedule an ER follow-up appointment. No available appointments with Dr. Gualberto Bell this week, offered an appointment with another provider, patient declined. Patient offered first available appointment with Dr. Gualberto Bell:  Future Appointments   Date Time Provider Dhruv Morales   2022  9:20 AM DO YENI Tapia     Patient requested to be added to a wait list if there are any cancellations.  Spoke with Trinidad Conner in the phone room (name and  of patient verified) who assisted with adding patient to wait list.

## 2022-08-08 ENCOUNTER — OFFICE VISIT (OUTPATIENT)
Dept: FAMILY MEDICINE CLINIC | Facility: CLINIC | Age: 62
End: 2022-08-08
Payer: COMMERCIAL

## 2022-08-08 VITALS
SYSTOLIC BLOOD PRESSURE: 123 MMHG | WEIGHT: 180.81 LBS | DIASTOLIC BLOOD PRESSURE: 76 MMHG | BODY MASS INDEX: 30.87 KG/M2 | HEIGHT: 64 IN | TEMPERATURE: 98 F

## 2022-08-08 DIAGNOSIS — R42 VERTIGO: Primary | ICD-10-CM

## 2022-08-08 PROCEDURE — 99214 OFFICE O/P EST MOD 30 MIN: CPT | Performed by: FAMILY MEDICINE

## 2022-08-08 PROCEDURE — 3008F BODY MASS INDEX DOCD: CPT | Performed by: FAMILY MEDICINE

## 2022-08-08 PROCEDURE — 3074F SYST BP LT 130 MM HG: CPT | Performed by: FAMILY MEDICINE

## 2022-08-08 PROCEDURE — 3078F DIAST BP <80 MM HG: CPT | Performed by: FAMILY MEDICINE

## 2022-08-08 RX ORDER — INSULIN GLARGINE-YFGN 100 [IU]/ML
INJECTION, SOLUTION SUBCUTANEOUS
COMMUNITY
Start: 2022-08-02

## 2022-08-08 RX ORDER — PRAVASTATIN SODIUM 20 MG
20 TABLET ORAL DAILY
COMMUNITY
Start: 2022-03-16 | End: 2023-03-16

## 2022-08-08 RX ORDER — ORAL SEMAGLUTIDE 14 MG/1
TABLET ORAL
COMMUNITY
Start: 2022-06-20

## 2022-08-08 NOTE — PATIENT INSTRUCTIONS
Medication reviewed and renewed where needed and appropriate. Comply with medications. Monitor blood pressures and record at home. Limit salt intake. Patient referred for vestibular therapy. Patient also referred to neurology for formal work-up regarding acute onset vertigo.

## 2022-08-12 NOTE — ED NOTES
Pt discharged to care of self. Pt assessed by MD. All orders completed and acknowledged. Pt new medication and after care discussed, all questions answered. Pt confirmed understanding.
12-Aug-2022 19:47

## 2023-03-04 ENCOUNTER — TELEPHONE (OUTPATIENT)
Dept: FAMILY MEDICINE CLINIC | Facility: CLINIC | Age: 63
End: 2023-03-04

## 2023-03-04 NOTE — TELEPHONE ENCOUNTER
Called patient no answer left detailed message to call back in regards to colon cancer screening. As well as left a Triton.

## 2023-04-14 ENCOUNTER — LAB ENCOUNTER (OUTPATIENT)
Dept: LAB | Age: 63
End: 2023-04-14
Attending: FAMILY MEDICINE
Payer: COMMERCIAL

## 2023-04-14 ENCOUNTER — OFFICE VISIT (OUTPATIENT)
Dept: FAMILY MEDICINE CLINIC | Facility: CLINIC | Age: 63
End: 2023-04-14

## 2023-04-14 VITALS
HEIGHT: 64 IN | TEMPERATURE: 98 F | DIASTOLIC BLOOD PRESSURE: 71 MMHG | HEART RATE: 92 BPM | WEIGHT: 187.13 LBS | BODY MASS INDEX: 31.95 KG/M2 | SYSTOLIC BLOOD PRESSURE: 118 MMHG

## 2023-04-14 DIAGNOSIS — Z12.31 ENCOUNTER FOR SCREENING MAMMOGRAM FOR BREAST CANCER: ICD-10-CM

## 2023-04-14 DIAGNOSIS — Z28.21 TETANUS, DIPHTHERIA, AND ACELLULAR PERTUSSIS (TDAP) VACCINATION DECLINED: ICD-10-CM

## 2023-04-14 DIAGNOSIS — E11.9 TYPE 2 DIABETES MELLITUS WITHOUT COMPLICATION, WITHOUT LONG-TERM CURRENT USE OF INSULIN (HCC): ICD-10-CM

## 2023-04-14 DIAGNOSIS — Z12.11 COLON CANCER SCREENING: Primary | ICD-10-CM

## 2023-04-14 DIAGNOSIS — Z00.00 ROUTINE PHYSICAL EXAMINATION: ICD-10-CM

## 2023-04-14 DIAGNOSIS — Z01.419 ENCOUNTER FOR CERVICAL PAP SMEAR WITH PELVIC EXAM: ICD-10-CM

## 2023-04-14 DIAGNOSIS — Z28.21 HERPES ZOSTER VACCINATION DECLINED: ICD-10-CM

## 2023-04-14 LAB
ALBUMIN SERPL-MCNC: 4.2 G/DL (ref 3.4–5)
ALBUMIN/GLOB SERPL: 1.1 {RATIO} (ref 1–2)
ALP LIVER SERPL-CCNC: 90 U/L
ALT SERPL-CCNC: 29 U/L
ANION GAP SERPL CALC-SCNC: 10 MMOL/L (ref 0–18)
AST SERPL-CCNC: 21 U/L (ref 15–37)
BASOPHILS # BLD AUTO: 0.07 X10(3) UL (ref 0–0.2)
BASOPHILS NFR BLD AUTO: 0.8 %
BILIRUB SERPL-MCNC: 0.3 MG/DL (ref 0.1–2)
BUN BLD-MCNC: 9 MG/DL (ref 7–18)
BUN/CREAT SERPL: 15.5 (ref 10–20)
CALCIUM BLD-MCNC: 9.1 MG/DL (ref 8.5–10.1)
CHLORIDE SERPL-SCNC: 105 MMOL/L (ref 98–112)
CHOLEST SERPL-MCNC: 122 MG/DL (ref ?–200)
CO2 SERPL-SCNC: 25 MMOL/L (ref 21–32)
CREAT BLD-MCNC: 0.58 MG/DL
CREAT UR-SCNC: 95.5 MG/DL
DEPRECATED RDW RBC AUTO: 47.3 FL (ref 35.1–46.3)
EOSINOPHIL # BLD AUTO: 0.07 X10(3) UL (ref 0–0.7)
EOSINOPHIL NFR BLD AUTO: 0.8 %
ERYTHROCYTE [DISTWIDTH] IN BLOOD BY AUTOMATED COUNT: 16.3 % (ref 11–15)
FASTING PATIENT LIPID ANSWER: NO
FASTING STATUS PATIENT QL REPORTED: NO
GFR SERPLBLD BASED ON 1.73 SQ M-ARVRAT: 103 ML/MIN/1.73M2 (ref 60–?)
GLOBULIN PLAS-MCNC: 3.7 G/DL (ref 2.8–4.4)
GLUCOSE BLD-MCNC: 81 MG/DL (ref 70–99)
HCT VFR BLD AUTO: 39.7 %
HDLC SERPL-MCNC: 56 MG/DL (ref 40–59)
HGB BLD-MCNC: 12.5 G/DL
IMM GRANULOCYTES # BLD AUTO: 0.02 X10(3) UL (ref 0–1)
IMM GRANULOCYTES NFR BLD: 0.2 %
LDLC SERPL CALC-MCNC: 49 MG/DL (ref ?–100)
LYMPHOCYTES # BLD AUTO: 3.44 X10(3) UL (ref 1–4)
LYMPHOCYTES NFR BLD AUTO: 37.6 %
MCH RBC QN AUTO: 25.4 PG (ref 26–34)
MCHC RBC AUTO-ENTMCNC: 31.5 G/DL (ref 31–37)
MCV RBC AUTO: 80.5 FL
MICROALBUMIN UR-MCNC: 9.01 MG/DL
MICROALBUMIN/CREAT 24H UR-RTO: 94.3 UG/MG (ref ?–30)
MONOCYTES # BLD AUTO: 0.73 X10(3) UL (ref 0.1–1)
MONOCYTES NFR BLD AUTO: 8 %
NEUTROPHILS # BLD AUTO: 4.82 X10 (3) UL (ref 1.5–7.7)
NEUTROPHILS # BLD AUTO: 4.82 X10(3) UL (ref 1.5–7.7)
NEUTROPHILS NFR BLD AUTO: 52.6 %
NONHDLC SERPL-MCNC: 66 MG/DL (ref ?–130)
OSMOLALITY SERPL CALC.SUM OF ELEC: 288 MOSM/KG (ref 275–295)
PLATELET # BLD AUTO: 288 10(3)UL (ref 150–450)
POTASSIUM SERPL-SCNC: 3.5 MMOL/L (ref 3.5–5.1)
PROT SERPL-MCNC: 7.9 G/DL (ref 6.4–8.2)
RBC # BLD AUTO: 4.93 X10(6)UL
SODIUM SERPL-SCNC: 140 MMOL/L (ref 136–145)
TRIGL SERPL-MCNC: 89 MG/DL (ref 30–149)
TSI SER-ACNC: 1.72 MIU/ML (ref 0.36–3.74)
VLDLC SERPL CALC-MCNC: 13 MG/DL (ref 0–30)
WBC # BLD AUTO: 9.2 X10(3) UL (ref 4–11)

## 2023-04-14 PROCEDURE — 80053 COMPREHEN METABOLIC PANEL: CPT

## 2023-04-14 PROCEDURE — 85025 COMPLETE CBC W/AUTO DIFF WBC: CPT

## 2023-04-14 PROCEDURE — 82570 ASSAY OF URINE CREATININE: CPT

## 2023-04-14 PROCEDURE — 80061 LIPID PANEL: CPT

## 2023-04-14 PROCEDURE — 82043 UR ALBUMIN QUANTITATIVE: CPT

## 2023-04-14 PROCEDURE — 36415 COLL VENOUS BLD VENIPUNCTURE: CPT

## 2023-04-14 PROCEDURE — 84443 ASSAY THYROID STIM HORMONE: CPT

## 2023-04-14 NOTE — PATIENT INSTRUCTIONS
All adult screening ordered and done appropriate for patient's age and gender and risk factors and complaints. Medication reviewed and renewed where needed and appropriate. Comply with medications. Monitor blood pressures and record at home. Limit salt intake. Encouraged safe physical fitness and daily physical activity daily.

## 2023-09-19 ENCOUNTER — OFFICE VISIT (OUTPATIENT)
Dept: GASTROENTEROLOGY | Facility: CLINIC | Age: 63
End: 2023-09-19

## 2023-09-19 ENCOUNTER — TELEPHONE (OUTPATIENT)
Dept: GASTROENTEROLOGY | Facility: CLINIC | Age: 63
End: 2023-09-19

## 2023-09-19 VITALS
HEART RATE: 73 BPM | DIASTOLIC BLOOD PRESSURE: 61 MMHG | BODY MASS INDEX: 30.9 KG/M2 | HEIGHT: 64 IN | SYSTOLIC BLOOD PRESSURE: 108 MMHG | WEIGHT: 181 LBS

## 2023-09-19 DIAGNOSIS — Z12.11 COLON CANCER SCREENING: Primary | ICD-10-CM

## 2023-09-19 DIAGNOSIS — Z80.0 FAMILY HISTORY- STOMACH CANCER: Primary | ICD-10-CM

## 2023-09-19 PROCEDURE — 3008F BODY MASS INDEX DOCD: CPT | Performed by: INTERNAL MEDICINE

## 2023-09-19 PROCEDURE — 3078F DIAST BP <80 MM HG: CPT | Performed by: INTERNAL MEDICINE

## 2023-09-19 PROCEDURE — S0285 CNSLT BEFORE SCREEN COLONOSC: HCPCS | Performed by: INTERNAL MEDICINE

## 2023-09-19 PROCEDURE — 3074F SYST BP LT 130 MM HG: CPT | Performed by: INTERNAL MEDICINE

## 2023-09-19 NOTE — PATIENT INSTRUCTIONS
1. Schedule colonoscopy with [facility] [Diagnosis: CRC screening]    2.  bowel prep from pharmacy (split dose golytely)    3. Medication adjustment:       A. Hold Rybelsus (semaglutide) for 1 week before the colonoscopy. [place] for 4 days before the colonoscopy. C. We will ask your endocrinology how to adjust your insulin prior to the procedure. 4. Read all bowel prep instructions carefully    5. AVOID seeds, nuts, popcorn, raw fruits and vegetables (cooked is okay) for 2-3 days before procedure    6. If you start any NEW medication after your visit today, please notify us. Certain medications will need to be held before the procedure, or the procedure cannot be performed. 7. Have the H. Pylori breath test done when able.

## 2023-09-19 NOTE — TELEPHONE ENCOUNTER
Scheduled for:  colonoscopy [id number]  Provider Name:  Dr. Aniyah Wilks  Date:  3/6/24  Location:  [facility]  Sedation:  [facility]  Time:  12:30 pm (pt is aware to arrive at 11:30 am)    Prep:  Golytely  Meds/Allergies Reconciled?:  Physician reviewed      Diagnosis with codes:  colon screening Z12.11  Was patient informed to call insurance with codes (Y/N): yes      Referral sent?:  Referral was sent at the time of electronic surgical scheduling. [facility] or [address] [place] [address] notified?:  I sent an electronic request to Endo Scheduling and received a confirmation today. Medication Orders:     A. Hold Rybelsus (semaglutide) for 1 week before the colonoscopy. Sherry Kind for 4 days before the colonoscopy. C. We will ask your endocrinology how to adjust your insulin prior to the procedure. Pt is aware to NOT take iron pills, herbal meds and diet supplements for 7 days before exam. Also to NOT take any form of alcohol, recreational drugs and any forms of ED meds 24 hours before exam.     Misc Orders:       Further instructions given by staff:  I discussed prep instructions with the patient at the time of the appointment which she verbally understood and given the prep instructions at the time of the appointment. Patient was informed about the new cancellation policy for his/her procedure. Patient was also given a copy of the cancellation policy at the time of the appointment and verbalized understanding.

## 2023-09-19 NOTE — H&P
[facility], [facility] - Gastroenterology                                                                                                               Reason for consult: CRC screening     Requesting physician or provider: Parth Cabrera DO    Patient presents with:  Colonoscopy Screening: consult      HPI:   Sheila Stone is a 65year old year-old female with history of DM-II, HTN here for the following: The pt is here to discuss CRC screening. Patient currently denies any GI symptoms of nausea, vomiting, dyspepsia, dysphagia, hematemesis, abdominal pain, change in bowel habits, thin stools, hematochezia, or melena. Additionally there is no weight loss and no reported history of chest pain or shortness of breath. For her diabetes, she is on insulin, is being weaned off Trulicity injections and is going to be on rybelsus tablets once a day, and is on Jardiance. Denies family h/o CRC. Pt's sister had pancreas cancer though the doctors weren't sure if it started in the pancreas or the stomach. Denies any other family members with pancreas cancer. Prior endoscopies:  None.      Soc:  -denies smoking  -denies heavy Etoh  -no illicit drug use    Wt Readings from Last 6 Encounters:  09/19/23 : 181 lb (82.1 kg)  04/14/23 : 187 lb 2 oz (84.9 kg)  08/08/22 : 180 lb 12.8 oz (82 kg)  07/31/22 : 178 lb (80.7 kg)  07/31/22 : 177 lb 14.6 oz (80.7 kg)  04/18/22 : 178 lb (80.7 kg)       History, Medications, Allergies, ROS:      Past Medical History:   Diagnosis Date    Diabetes ([address]     Essential hypertension     Hemorrhoids 1984    After giving birth      Past Surgical History:   Procedure Laterality Date    TUBAL LIGATION  1986      Family Hx:   Family History   Problem Relation Age of Onset    Cancer Sister         Pancreatic      Social History:   Social History     Socioeconomic History    Marital status:    Tobacco Use    Smoking status: Never    Smokeless tobacco: Never    Tobacco comments:     Smoked socially. None since 2005   Vaping Use    Vaping Use: Never used   Substance and Sexual Activity    Alcohol use: Yes     Comment: Occasional    Drug use: Never        Medications (Active prior to today's visit):  Current Outpatient Medications   Medication Sig Dispense Refill    polyethylene glycol, PEG 3350-KCl-NaBcb-NaCl-NaSulf, 236 g Oral Recon Soln Take 4,000 mL by mouth once for 1 dose. As directed by GI clinic instructions. 4000 mL 0    Empagliflozin 25 MG Oral Tab TAKE 1/2 TABLET BY MOUTH DAILY FOR 4 WEEKS, THEN 1 TABLET DAILY      SEMGLEE, YFGN, 100 UNIT/ML Subcutaneous Solution Pen-injector       Continuous Blood Gluc  (DEXCOM G6 ) Does not apply Device 1 Product by Does not apply route 4 (four) times daily as needed. 1 Device 0    Continuous Blood Gluc Sensor (DEXCOM G6 SENSOR) Does not apply Misc 1 Product by Does not apply route 4 (four) times daily as needed. 3 each 11    amLODIPine Besylate 10 MG Oral Tab TK 1 T PO QD  0    BD PEN NEEDLE SHORT U/F 31G X 8 MM Does not apply Misc   5    losartan 100 MG Oral Tab TK 1 T PO D  0    metFORMIN HCl  MG Oral Tablet 24 Hr TK 2 TS PO BID  3    meclizine 25 MG Oral Tab Take 1 tablet (25 mg total) by mouth 3 (three) times daily as needed.  (Patient not taking: Reported on 4/14/2023) 30 tablet 0    ergocalciferol 31042 units Oral Cap  (Patient not taking: Reported on 9/19/2023)  1       Allergies:    Sulfa Antibiotics       ANAPHYLAXIS    Comment:[facility]erner Allergy Text Annotation: sulfa drugs    ROS:   CONSTITUTIONAL:  negative for fevers, rigors  EYES:  negative for diplopia   RESPIRATORY:  negative for severe shortness of breath  CARDIOVASCULAR:  negative for crushing sub-sternal chest pain  GASTROINTESTINAL:  see HPI  GENITOURINARY:  negative for dysuria or gross hematuria  INTEGUMENT/BREAST:  SKIN:  negative for jaundice ALLERGIC/IMMUNOLOGIC:  negative for hay fever  ENDOCRINE:  negative for cold intolerance and heat intolerance  MUSCULOSKELETAL:  negative for joint effusion/severe erythema  BEHAVIOR/PSYCH:  negative for psychotic behavior      PHYSICAL EXAM:   Blood pressure 108/61, pulse 73, height 5' 4\" (1.626 m), weight 181 lb (82.1 kg). Gen- Patient appears comfortable and in no acute discomfort  HEENT: the sclera appears anicteric, oropharynx clear, mucus membranes appear moist  CV- regular rate and rhythm, the extremities are warm and well perfused   Lung- Moves air well; No labored breathing  Abdomen- soft, non-tender exam in all quadrants without rigidity or guarding, non-distended, no abnormal bowel sounds noted, no masses are palpated  Skin- No jaundice  Ext: no cyanosis, clubbing or edema is evident. Neuro- Alert and interactive, and gross movements of extremities normal  Psych - appropriate, non-agitated    Labs/Imaging:     Patient's pertinent labs and imaging were reviewed and discussed with patient today. .  ASSESSMENT/PLAN:   Brandi Potter is a 65year old year-old female with history of DM-II, HTN here for the following:    CRC screening - average risk and asymptomatic. We discussed her options, including but not limited to noninvasive stool testing and colonoscopy, and she would like to proceed with a colonoscopy. Family h/o pancreas vs stomach cancer - Pt's sister had pancreas cancer and had a Whipple though the doctors weren't sure if it started in the pancreas or the stomach. Denies any other family members with pancreas cancer. Will r/o H. Pylori. Recommend    - CLN with MAC    - CLD the day prior, NPO after midnight, split dose golytely  - Medication adjustment:   A. Hold Rybelsus (semaglutide) for 1 week before the colonoscopy. [place] for 4 days before the colonoscopy. C. Will ask the pt's endocrinologist how to adjust the insulin prior to the procedure. - H. Pylori breath test    Colonoscopy consent: I have discussed the risks, benefits, and alternatives to colonoscopy with the patient/primary decision maker [who demonstrated understanding], including but not limited to the risks of bleeding, infection, pain, death, as well as the risks of anesthesia and perforation all leading to prolonged hospitalization, surgical intervention, or even death. I also specifically mentioned the miss rate of colonoscopy of 5-10% in the best of all circumstances. The patient has agreed to sign an informed consent and elected to proceed with colonoscopy with possible intervention [i.e. polypectomy, stent placement, etc.] as indicated. Orders This Visit:  Orders Placed This Encounter      Helicobacter Pylori Breath Test, Adult      Meds This Visit:  Requested Prescriptions     Signed Prescriptions Disp Refills    polyethylene glycol, PEG 3350-KCl-NaBcb-NaCl-NaSulf, 236 g Oral Recon Soln 4000 mL 0     Sig: Take 4,000 mL by mouth once for 1 dose. As directed by GI clinic instructions. Imaging & Referrals:  None         Paulina Rogers MD          This note was partially prepared using [facility] 1[employer] voice recognition dictation software. As a result, errors may occur. When identified, these errors have been corrected.  While every attempt is made to correct errors during dictation, discrepancies may still exist.

## 2023-09-19 NOTE — TELEPHONE ENCOUNTER
GI staff - please ask the patient's endocrinology how to adjust her insulin prior to the procedure. She is currently on: Insulin  Jardinace  Rybelsus (semaglutide)    I already told her to hold Jardiance 4 days before the CLN and the Rybelsus for 1 week prior to the [address].     Thanks, SS

## 2023-09-19 NOTE — TELEPHONE ENCOUNTER
Called patient, name/ verified. Stated Dr. Diaz Matute and with Dr. Margoth Rosales are her endocrinologists. She has appt with Dr. Diaz Matute on 23. MD contact number below:     JACKIE Kimball-CNP  Family Medicine  NPI: [id number]  [id number] [address]     Phone: [phone]  Fax: [phone]    Will send Dr. Alvarado Heading request below in December. Chart postponed.

## 2023-11-13 LAB
CYTOLOGY CVX/VAG DOC THIN PREP: NORMAL
HPV16+18+45 E6+E7MRNA CVX NAA+PROBE: NEGATIVE

## 2023-11-16 ENCOUNTER — TELEPHONE (OUTPATIENT)
Dept: GASTROENTEROLOGY | Facility: CLINIC | Age: 63
End: 2023-11-16

## 2023-11-16 NOTE — TELEPHONE ENCOUNTER
1st,overdue reminder letter mailed out to patient   Labs order :  Helicobacter Pylori Breath Test, Adult (Order #[id number]) on 9/19/23

## 2024-06-05 ENCOUNTER — OFFICE VISIT (OUTPATIENT)
Dept: SURGERY | Facility: CLINIC | Age: 64
End: 2024-06-05
Payer: COMMERCIAL

## 2024-06-05 VITALS
HEIGHT: 64 IN | BODY MASS INDEX: 29.88 KG/M2 | HEART RATE: 80 BPM | OXYGEN SATURATION: 95 % | SYSTOLIC BLOOD PRESSURE: 102 MMHG | WEIGHT: 175 LBS | DIASTOLIC BLOOD PRESSURE: 70 MMHG

## 2024-06-05 DIAGNOSIS — Z51.81 ENCOUNTER FOR THERAPEUTIC DRUG MONITORING: ICD-10-CM

## 2024-06-05 DIAGNOSIS — E66.9 OBESITY (BMI 30-39.9): ICD-10-CM

## 2024-06-05 DIAGNOSIS — I10 HYPERTENSION, UNSPECIFIED TYPE: ICD-10-CM

## 2024-06-05 DIAGNOSIS — E11.9 TYPE 2 DIABETES MELLITUS WITHOUT COMPLICATION, UNSPECIFIED WHETHER LONG TERM INSULIN USE (HCC): Primary | ICD-10-CM

## 2024-06-05 DIAGNOSIS — E78.5 DYSLIPIDEMIA: ICD-10-CM

## 2024-06-05 PROCEDURE — 3078F DIAST BP <80 MM HG: CPT | Performed by: NURSE PRACTITIONER

## 2024-06-05 PROCEDURE — 99214 OFFICE O/P EST MOD 30 MIN: CPT | Performed by: NURSE PRACTITIONER

## 2024-06-05 PROCEDURE — 3008F BODY MASS INDEX DOCD: CPT | Performed by: NURSE PRACTITIONER

## 2024-06-05 PROCEDURE — 3074F SYST BP LT 130 MM HG: CPT | Performed by: NURSE PRACTITIONER

## 2024-06-05 RX ORDER — SEMAGLUTIDE 0.68 MG/ML
0.25 INJECTION, SOLUTION SUBCUTANEOUS WEEKLY
Qty: 3 ML | Refills: 3 | Status: SHIPPED | OUTPATIENT
Start: 2024-06-05

## 2024-06-05 NOTE — PROGRESS NOTES
[facility] Consultation Note       Date of Consult:  2024    Patient:  Dhara England  :      1960  MRN:      [id number]    Referring Provider: Self     Chief Complaint:    Chief Complaint   Patient presents with    Consult    Weight Management    Obesity       SUBJECTIVE     History of Present Illness:  Dhara England has been referred to me for evaluation and treatment.       64 yo female.   Presents to clinic for assistance with weight loss/maintenance.   Reports weight gain during menopause.     Has been currently taking phentermine for weight loss 1/2 tablet every other day.   Recently restarted two week ago.   Last dose prior to this 4-5 months ago.   Patient reports she has never consistently taken phentermine medication.     Hx Type 2 Diabetes.  On insulin, metformin and jardiance.   Has been taking Ozempic 0.25 mg weekly. Could not tolerate 0.5 mg weekly dose- vomiting.   Trialed friend's medication.   Insulin dose 30 units on Ozempic. FBS 100s.     Patient is considering medications and does not currently qualify for bariatric surgery for weight loss.    Patient is employed: [employer].  Patient lives with , daughter, granddaughter.    Patient's goal weight: 155 lbs  Biggest weight loss in the past: 30  lbs  How weight loss was achieved: improved nutrition   Heaviest weight ever: 224 lbs   Previous use of medical weight loss medications: phentermine, jardiance, ozempic     Physical activity: walks     Sleep: interrupted hours/night    Sleep screening:       Past Medical History:   Past Medical History:    Diabetes (HCC)    Essential hypertension    Hemorrhoids    After giving birth       OBJECTIVE     Vitals: /70 (BP Location: Right arm, Patient Position: Sitting, Cuff Size: adult)   Pulse 80   Ht 5' 4\" (1.626 m)   Wt 175 lb (79.4 kg)   SpO2 95%   BMI 30.04 kg/m²        Wt Readings from Last 6 Encounters:   24 175 lb (79.4 kg)   23 181 lb  (82.1 kg)   04/14/23 187 lb 2 oz (84.9 kg)   08/08/22 180 lb 12.8 oz (82 kg)   07/31/22 178 lb (80.7 kg)   07/31/22 177 lb 14.6 oz (80.7 kg)        Patient Medications:    Current Outpatient Medications   Medication Sig Dispense Refill    PRAVASTATIN SODIUM OR Take by mouth.      semaglutide (OZEMPIC, 0.25 OR 0.5 MG/DOSE,) 2 MG/3ML Subcutaneous Solution Pen-injector Inject 0.25 mg into the skin once a week. 3 mL 3    Empagliflozin 25 MG Oral Tab TAKE 1/2 TABLET BY MOUTH DAILY FOR 4 WEEKS, THEN 1 TABLET DAILY      SEMGLEE, LINGGN, 100 UNIT/ML Subcutaneous Solution Pen-injector       Continuous Blood Gluc  (DEXCOM G6 ) Does not apply Device 1 Product by Does not apply route 4 (four) times daily as needed. 1 Device 0    Continuous Blood Gluc Sensor (DEXCOM G6 SENSOR) Does not apply Misc 1 Product by Does not apply route 4 (four) times daily as needed. 3 each 11    amLODIPine Besylate 10 MG Oral Tab TK 1 T PO QD  0    ergocalciferol 06058 units Oral Cap  (Patient not taking: Reported on 9/19/2023)  1    BD PEN NEEDLE SHORT U/F 31G X 8 MM Does not apply Misc   5    losartan 100 MG Oral Tab TK 1 T PO D  0    metFORMIN HCl  MG Oral Tablet 24 Hr TK 2 TS PO BID  3       Allergies:  Sulfa antibiotics     Comorbidities:  Type 2 DM, Dyslipidemia, HTN    Social History:  Reviewed     Surgical History:    Past Surgical History:   Procedure Laterality Date    Tubal ligation  1986       Family History:    Family History   Problem Relation Age of Onset    Cancer Sister         Pancreatic       Typical Dietary Intake:  Breakfast AM Snack Lunch PM Snack Dinner   Skips  Fruit  salad Fruit  Salad     Pasta       After dinner behavior: +cookies, milk   Night eating: -  Portion sizes: +  Binge: -  Emotional: -  Depression: Score: 5  Grazing: -  Sweet tooth: + evening   Crunchy/salty: -  Etoh: Rare   Drinks celsius, water   Soda Drinker: No    Juice:  No     Number of restaurant or fast food meals/week:  1-2  meals/week    Nutritional Goals Reviewed and Discussed:     Eat 3-4 cups of fresh fruit or vegetables daily    Behavior Modifications Reviewed and Discussed:    Eat breakfast, Eat 3 meals per day, Plan meals in advance, Read nutrition labels, Drink 64oz of water per day, Maintain a daily food journal, Utilize portion control strategies to reduce calorie intake, Identify triggers for eating and manage cues, and Eat slowly and take 20 to 30 minutes to complete each meal      ROS:  Constitutional: positive for fatigue  Respiratory: negative  Cardiovascular: negative  Gastrointestinal: negative  Integument/breast: negative  Hematologic/lymphatic: negative  Musculoskeletal:negative  Neurological: negative  Behavioral/Psych: negative  Endocrine: positive for Type 2 DM    Physical Exam:  General appearance: alert, appears stated age, cooperative and obese  Head: Normocephalic, without obvious abnormality, atraumatic  Neck: no adenopathy, no carotid bruit, no JVD, supple, symmetrical, trachea midline and thyroid not enlarged, symmetric, no tenderness/mass/nodules  Lungs: clear to auscultation bilaterally  Heart: S1, S2 normal, no murmur, click, rub or gallop, regular rate and rhythm  Abdomen: soft, non-tender; bowel sounds normal; no masses,  no organomegaly and abdomen obese   Extremities: intact, no edema   Pulses: 2+ and symmetric  Skin: intact   Neurologic: Grossly normal      ASSESSMENT     Encounter Diagnosis(ses):   1. Type 2 diabetes mellitus without complication, unspecified whether long term insulin use (HCC)    2. Hypertension, unspecified type    3. Obesity (BMI 30-39.9)    4. Dyslipidemia    5. Encounter for therapeutic drug monitoring      PLAN     Diagnoses and all orders for this visit:    Type 2 diabetes mellitus without complication, unspecified whether long term insulin use (HCC)  -     semaglutide (OZEMPIC, 0.25 OR 0.5 MG/DOSE,) 2 MG/3ML Subcutaneous Solution Pen-injector; Inject 0.25 mg into the skin  once a week.  -     DIETITIAN EDUCATION INITIAL, DIET (INTERNAL)    Hypertension, unspecified type  -     DIETITIAN EDUCATION INITIAL, DIET (INTERNAL)    Obesity (BMI 30-39.9)  -     DIETITIAN EDUCATION INITIAL, DIET (INTERNAL)    Dyslipidemia    Encounter for therapeutic drug monitoring      HYPERTENSION: Blood pressure stable on the above medications. No interval change in antihypertensive medication.     DYSLIPIDEMIA: On statin. Recommend dietary changes and lifestyle modifications as discussed below. Monitor.       Lab Results   Component Value Date/Time    CHOLEST 122 04/14/2023 02:06 PM    LDL 49 04/14/2023 02:06 PM    HDL 56 04/14/2023 02:06 PM    TRIG 89 04/14/2023 02:06 PM    VLDL 13 04/14/2023 02:06 PM     DIABETES: Continue current medications. Monitor.       OBESITY/WEIGHT GAIN:    Recommended intensive lifestyle and behavioral modifications at this time for weight loss.    Educated patient on lifestyle modifications: Whole Food/Plant Strong/Low Glycemic Index diet, moderate alcohol consumption, reduced sodium intake to no more than 2,400 mg/day, and at least 150 minutes of moderate physical activity per week.   Avoid processed, poor quality carbohydrates, refined grains, flour, sugar.    Goals for next month:  1. Keep a food log   2. Drink 64 ounces of non-caloric beverages per day. No fruit juices or regular soda.  3. Aim for 150 minutes moderate exercise per week.    4. Increase fruit and vegetable servings to 5-6 per day.    5. Improve sleep and stress.     Reviewed other labs:   4/14/2023: CMP and TSH in range.   12/23/2023: a1c-6.9%     Continue Metformin 500 mg BID.     Start Ozempic 0.25 mg weekly. Continue dose monthly as tolerated.  Avoid higher doses for now- vomiting at 0.5 mg weekly. Tolerated 0.25 mg weekly dose well.    Hold off on phentermine 37.5 mg 1/2 tablet every other day.    SQ administration teaching provided to patient.   Discussed risks, benefits, and side effects of medication.  Avoid pregnancy during use. Contraindications for medication discussed at length. Patient states understanding.      Meet RD.  Healthy Plate Method.    Recommend daily magnesium to improve sleep.     I spent 45 minutes preparing chart, obtaining/reviewing pertinent history, performing medically appropriate examination/evaluations, counseling/educating on assessment and plan, ordering and reviewing tests/medications as needed, referring/communicating with other health care professionals as needed, documenting clinical information, interpreting results, communicating results, and/or coordinating patient care.     RTC 6 weeks virtual.     Adelaida Felix APRN

## 2024-06-05 NOTE — PATIENT INSTRUCTIONS
Add Magnesium glycinate 200 to 500 mg/day for sleep.   Life Extension. NOW. Garden of Life. Whole Food Brand. MaryRuth's. Pure Encapsulations.     Or consider Natural Calm.   by Natural Vitality  Follow dosage instructions.  Start 1 teaspoon and increase up to 3 teaspoons as needed for sleep.

## 2024-06-15 DIAGNOSIS — E11.9 TYPE 2 DIABETES MELLITUS WITHOUT COMPLICATION, WITHOUT LONG-TERM CURRENT USE OF INSULIN (HCC): Primary | ICD-10-CM

## 2024-06-18 RX ORDER — PRAVASTATIN SODIUM 20 MG
20 TABLET ORAL NIGHTLY
Qty: 90 TABLET | Refills: 1 | Status: SHIPPED | OUTPATIENT
Start: 2024-06-18

## 2024-06-18 NOTE — TELEPHONE ENCOUNTER
I reviewed the patient's medication history and saw that she has been on pravastatin 20 mg orally nightly.  This is the strength I have refilled.  Please call the patient and let her know.  Thank you.

## 2024-06-18 NOTE — TELEPHONE ENCOUNTER
Please review; protocol failed/ has no protocol      Medication is listed as patient reported.  Message sent for patient to make an appointment.       Requested Prescriptions   Pending Prescriptions Disp Refills    PRAVASTATIN SODIUM OR  0     Sig: Take by mouth.       Cholesterol Medication Protocol Failed - 6/15/2024  7:53 AM        Failed - ALT < 80     Lab Results   Component Value Date    ALT 29 04/14/2023             Failed - ALT resulted within past year        Failed - Lipid panel within past 12 months     Lab Results   Component Value Date    CHOLEST 122 04/14/2023    TRIG 89 04/14/2023    HDL 56 04/14/2023    LDL 49 04/14/2023    VLDL 13 04/14/2023    NONHDLC 66 04/14/2023             Failed - In person appointment or virtual visit in the past 12 mos or appointment in next 3 mos     Recent Outpatient Visits              1 week ago Type 2 diabetes mellitus without complication, unspecified whether long term insulin use (HCC)    [facility]    [facility]    9 months ago Family history- stomach cancer    [facility] Page Manzanares MD    Office Visit    1 year ago Colon cancer screening    [facility] Cristian Nagel DO    Office Visit    1 year ago Vertigo    [facility] Cristian Nagel DO    Office Visit    3 years ago [facility]ilateral carpal tunnel syndrome    [facility]    [facility] Visit          Future Appointments         Provider Department Appt Notes    [facility] 3 months [facility]                        Recent Outpatient Visits              1 week ago Type 2 diabetes mellitus without complication, unspecified whether long term insulin use (HCC)    [facility],  dAelaida Pool APRN    Office Visit    9 months ago Family history- stomach cancer    [facility] Page Manzanares MD    Office Visit    1 year ago Colon cancer screening    [facility] Cristian Nagel, DO    Office Visit    1 year ago Vertigo    [facility] Cristian Nagel, DO    Office Visit    3 years ago [facility]          Future Appointments         Provider Department Appt Notes    In 3 months [facility]

## 2024-06-18 NOTE — TELEPHONE ENCOUNTER
ANSELMO: Talked to patient offered her an appointment but she says that she will make an appointment thru her MyChart.

## 2024-06-19 NOTE — TELEPHONE ENCOUNTER
Left general message that refill was approved to [facility] in [place]. Asked patient to call back if she has questions.

## 2024-10-07 ENCOUNTER — OFFICE VISIT (OUTPATIENT)
Dept: SURGERY | Facility: CLINIC | Age: 64
End: 2024-10-07
Payer: COMMERCIAL

## 2024-10-07 VITALS
DIASTOLIC BLOOD PRESSURE: 70 MMHG | SYSTOLIC BLOOD PRESSURE: 122 MMHG | HEIGHT: 64 IN | WEIGHT: 178 LBS | OXYGEN SATURATION: 94 % | BODY MASS INDEX: 30.39 KG/M2 | HEART RATE: 68 BPM

## 2024-10-07 DIAGNOSIS — I10 HYPERTENSION, UNSPECIFIED TYPE: ICD-10-CM

## 2024-10-07 DIAGNOSIS — Z51.81 ENCOUNTER FOR THERAPEUTIC DRUG MONITORING: ICD-10-CM

## 2024-10-07 DIAGNOSIS — E78.5 DYSLIPIDEMIA: ICD-10-CM

## 2024-10-07 DIAGNOSIS — E66.9 OBESITY (BMI 30-39.9): ICD-10-CM

## 2024-10-07 DIAGNOSIS — E11.9 TYPE 2 DIABETES MELLITUS WITHOUT COMPLICATION, UNSPECIFIED WHETHER LONG TERM INSULIN USE (HCC): Primary | ICD-10-CM

## 2024-10-07 PROCEDURE — 99214 OFFICE O/P EST MOD 30 MIN: CPT | Performed by: NURSE PRACTITIONER

## 2024-10-07 PROCEDURE — 3078F DIAST BP <80 MM HG: CPT | Performed by: NURSE PRACTITIONER

## 2024-10-07 PROCEDURE — 3008F BODY MASS INDEX DOCD: CPT | Performed by: NURSE PRACTITIONER

## 2024-10-07 PROCEDURE — 3074F SYST BP LT 130 MM HG: CPT | Performed by: NURSE PRACTITIONER

## 2024-10-07 RX ORDER — PHENTERMINE HYDROCHLORIDE 37.5 MG/1
37.5 TABLET ORAL
Qty: 30 TABLET | Refills: 3 | Status: SHIPPED | OUTPATIENT
Start: 2024-10-07

## 2024-10-07 RX ORDER — TOPIRAMATE 25 MG/1
25 TABLET, FILM COATED ORAL 2 TIMES DAILY
Qty: 60 TABLET | Refills: 3 | Status: SHIPPED | OUTPATIENT
Start: 2024-10-07

## 2024-10-07 NOTE — PROGRESS NOTES
[facility], [place]  [address] [facility] [address] [place] [id number]  Dept: [phone]       Patient:  Dhara England  :      1960  MRN:      [id number]    Chief Complaint:    Chief Complaint   Patient presents with    Follow - Up    Weight Management    Obesity       SUBJECTIVE     History of Present Illness:  Dhara is being seen today for a follow-up for weight management.    Ozempic too expensive. High deductible.    Initial HPI:  62 yo female.   Presents to clinic for assistance with weight loss/maintenance.   Reports weight gain during menopause.     Has been currently taking phentermine for weight loss 1/2 tablet every other day.   Recently restarted two week ago.   Last dose prior to this 4-5 months ago.   Patient reports she has never consistently taken phentermine medication.     Hx Type 2 Diabetes.  On insulin, metformin and jardiance.   Has been taking Ozempic 0.25 mg weekly. Could not tolerate 0.5 mg weekly dose- vomiting.   Trialed friend's medication.   Insulin dose 30 units on Ozempic. FBS 100s.     Patient is considering medications and does not currently qualify for bariatric surgery for weight loss.    Patient is employed: [employer].  Patient lives with , daughter, granddaughter.    Patient's goal weight: 155 lbs  Biggest weight loss in the past: 30  lbs  How weight loss was achieved: improved nutrition   Heaviest weight ever: 224 lbs   Previous use of medical weight loss medications: phentermine, jardiance, ozempic     Physical activity: walks     Sleep: interrupted hours/night    Sleep screening:     Past Medical History:   Past Medical History:    Diabetes (HCC)    Essential hypertension    Hemorrhoids    After giving birth        Comorbidities:  Diabetes-Improvement?  yes, Hypertension-Improvement?  yes, and Hyperlipidemia-Improvement?  yes    OBJECTIVE     Vitals: /70   Pulse 68   Ht 5' 4\" (1.626 m)   Wt 178 lb  (80.7 kg)   SpO2 94%   BMI 30.55 kg/m²     Initial weight loss: +03   Total weight loss: +03    Start weight: 175    Wt Readings from Last 6 Encounters:   10/07/24 178 lb (80.7 kg)   06/05/24 175 lb (79.4 kg)   09/19/23 181 lb (82.1 kg)   04/14/23 187 lb 2 oz (84.9 kg)   08/08/22 180 lb 12.8 oz (82 kg)   07/31/22 178 lb (80.7 kg)       Patient Medications:    Current Outpatient Medications   Medication Sig Dispense Refill    Phentermine HCl 37.5 MG Oral Tab Take 1 tablet (37.5 mg total) by mouth every morning before breakfast. Start with 1/2 tablet daily, increase to full tablet daily as tolerated 30 tablet 3    topiramate 25 MG Oral Tab Take 1 tablet (25 mg total) by mouth 2 (two) times daily. 60 tablet 3    pravastatin 20 MG Oral Tab Take 1 tablet (20 mg total) by mouth nightly. 90 tablet 1    PRAVASTATIN SODIUM OR Take by mouth.      Empagliflozin 25 MG Oral Tab TAKE 1/2 TABLET BY MOUTH DAILY FOR 4 WEEKS, THEN 1 TABLET DAILY      SEMGLEE, YFGN, 100 UNIT/ML Subcutaneous Solution Pen-injector       Continuous Blood Gluc  (DEXCOM G6 ) Does not apply Device 1 Product by Does not apply route 4 (four) times daily as needed. 1 Device 0    Continuous Blood Gluc Sensor (DEXCOM G6 SENSOR) Does not apply Misc 1 Product by Does not apply route 4 (four) times daily as needed. 3 each 11    amLODIPine Besylate 10 MG Oral Tab TK 1 T PO QD  0    ergocalciferol 66693 units Oral Cap  (Patient not taking: Reported on 9/19/2023)  1    BD PEN NEEDLE SHORT U/F 31G X 8 MM Does not apply Misc   5    losartan 100 MG Oral Tab TK 1 T PO D  0    metFORMIN HCl  MG Oral Tablet 24 Hr TK 2 TS PO BID  3     Allergies:  Sulfa antibiotics     Social History:  Reviewed     Surgical History:    Past Surgical History:   Procedure Laterality Date    Tubal ligation  1986     Family History:    Family History   Problem Relation Age of Onset    Cancer Sister         Pancreatic     Initial Intake:  After dinner behavior: +cookies,  milk   Night eating: -  Portion sizes: +  Binge: -  Emotional: -  Depression: Score: 5  Grazing: -  Sweet tooth: + evening   Crunchy/salty: -  Etoh: Rare   Drinks celsius, water   Soda Drinker: No                     Juice:  No         Number of restaurant or fast food meals/week:  1-2 meals/week    Food Journal  Reviewed and Discussed:       Patient has a Food Journal?: no   Patient is reading nutrition labels?  yes  Average Caloric Intake:     Average CHO Intake:   Is patient exercising? yes  Type of exercise? Walks 1.5-2 miles- goal 5 days/week     Eating Habits  Patient states the following:  Eats 2 meal(s) per day  Length of time it takes to consume a meal:    # of snacks per day:  Type of snacks:    Amount of soda consumption per day:    Amount of water (in ounces) per day:  at least 48 oz/day  Toughest challenge:  home stress     B: toast, fruit     Nutritional Goals  Eat 3-4 cups of fresh fruits or vegetables daily    Behavior Modifications Reviewed and Discussed  Eat breakfast, Eat 3 meals per day, Plan meals in advance, Read nutrition labels, Drink 64 oz of water per day, Maintain a daily food journal, Utlize portion control strategies to reduce calorie intake, Identify triggers for eating and manage cues, and Eat slowly and take 20 to 30 minutes to complete each meal    Exercise Goals Reviewed and Discussed    Aim for 150 minutes moderate level exercise weekly with 2-3 days strength training as tolerated     ROS:    Constitutional: positive for fatigue  Respiratory: negative  Cardiovascular: negative  Gastrointestinal: negative  Integument/breast: negative  Hematologic/lymphatic: negative  Musculoskeletal:negative  Neurological: negative  Behavioral/Psych: negative  Endocrine: positive for Type 2 DM  All other systems were reviewed and are negative    Physical Exam:   General appearance: alert, appears stated age, cooperative and obese  Head: Normocephalic, without obvious abnormality, atraumatic  Neck:  no adenopathy, no carotid bruit, no JVD, supple, symmetrical, trachea midline and thyroid not enlarged, symmetric, no tenderness/mass/nodules  Lungs: clear to auscultation bilaterally  Heart: S1, S2 normal, no murmur, click, rub or gallop, regular rate and rhythm  Abdomen: soft, non-tender; bowel sounds normal; no masses,  no organomegaly and abdomen obese   Extremities: intact, no edema   Pulses: 2+ and symmetric  Skin: intact   Neurologic: Grossly normal      ASSESSMENT       Encounter Diagnosis(ses):   Encounter Diagnoses   Name Primary?    Type 2 diabetes mellitus without complication, unspecified whether long term insulin use (HCC) Yes    Hypertension, unspecified type     Dyslipidemia     Obesity (BMI 30-39.9)     Encounter for therapeutic drug monitoring        PLAN       Diagnoses and all orders for this visit:    Type 2 diabetes mellitus without complication, unspecified whether long term insulin use (HCC)    Hypertension, unspecified type    Dyslipidemia    Obesity (BMI 30-39.9)  -     Phentermine HCl 37.5 MG Oral Tab; Take 1 tablet (37.5 mg total) by mouth every morning before breakfast. Start with 1/2 tablet daily, increase to full tablet daily as tolerated  -     topiramate 25 MG Oral Tab; Take 1 tablet (25 mg total) by mouth 2 (two) times daily.    Encounter for therapeutic drug monitoring        HYPERTENSION: Blood pressure stable on the above medications. No interval change in antihypertensive medication.      DYSLIPIDEMIA: On statin. Recommend dietary changes and lifestyle modifications as discussed below. Monitor.     Lab Results   Component Value Date/Time    CHOLEST 122 04/14/2023 02:06 PM    LDL 49 04/14/2023 02:06 PM    HDL 56 04/14/2023 02:06 PM    TRIG 89 04/14/2023 02:06 PM    VLDL 13 04/14/2023 02:06 PM     DIABETES: Continue current medications. Monitor.      OBESITY/WEIGHT GAIN:     Recommended patient continue intensive lifestyle and behavioral modifications at this time for weight  loss.     Reviewed lifestyle modifications: Whole Food/Plant Strong/Low Glycemic Index diet, moderate alcohol consumption, reduced sodium intake to no more than 2,400 mg/day, and at least 150 minutes of moderate physical activity per week.   Avoid processed, poor quality carbohydrates, refined grains, flour, sugar.     Goals for next month:  1. Keep a food log   2. Drink 64 ounces of non-caloric beverages per day. No fruit juices or regular soda.  3. Aim for 150 minutes moderate exercise per week.    4. Increase fruit and vegetable servings to 5-6 per day.    5. Improve sleep and stress.      Reviewed other labs:   4/14/2023: CMP and TSH in range.   12/23/2023: a1c-6.9%.  8/27/24- a1c 8.  CBC, CMP in range.   Triglycerides elevated.      Continue Metformin 500 mg BID.      Continue jardiance 25 mg daily.    Ozempic too expensive- hx vomiting at 0.5 mg weekly.   Difficulty tolerating Trulicity in the past.      Restart phentermine 37.5 mg 1/2 tablet every other day.     Add 25 mg topirmate around dinnertime. Increase to BID as tolerated.     SQ administration teaching provided to patient.   Discussed risks, benefits, and side effects of medication. Avoid pregnancy during use. Contraindications for medication discussed at length. Patient states understanding.       Meet with RD.  Healthy Plate Method.     Recommend daily magnesium to improve sleep.      RTC 4 months.      JACKIE Thompson

## 2024-10-07 NOTE — PATIENT INSTRUCTIONS
Aim for 65 grams protein/day.    25-30 grams/meal.     3 PM nuts/seeds.     Eat within 1-3 hours upon waking.     Meals between 7 or 9 AM and 7 PM.     6 days on, 1 day off.     Cronometer for tracking.    Add psyllium husk daily. [employer].     Build up to 35-40 grams of fiber/day.     Aim for 64 oz of water/day.    Aim for a total of:    4 non-starchy vegetables/day (1/2 cup cooked; 1 cup raw) (greens, peppers, onions, garlic, broccoli, cauliflower, brussels sprouts, asparagus, etc.)    3 protein per day (fish, seafood, meat, or plants: salmon, nuts, seeds, salmon, shrimp, chicken, turkey, beans, lentils, chickpeas, etc).    2 fruits a day (avocado, tomato, citrus/oranges, apples, berries, kiwi)  1/2 cup or medium size    2 healthy fats (avocado, avocado oil, olives, olive oil, salmon, nuts, seeds)    0-2 starches/day (oatmeal, sweet potato, carrots, brown rice, etc). 1/2 cup    I recommend a whole food, plant powered diet with low glycemic index:     Aim for 3 meals a day and 1-2 snacks as needed.    Aim for a protein + produce at each meal time.     Breakfast ideas:  1. Fruit and nuts/seeds.  2. Eggs scrambled with vegetables.  3. Oatmeal (stovetop), cinnamon, 2 tbsp flaxseed, berries, nuts.  4. Protein shake + fruit. (1 scoop with water/ice). Garden of Life Fit, Nutiva, Song, and Orgain are good brands.      Snacks:  Raw vegetables and hummus, apples and peanut butter, nuts, seeds, fruit, pecans drizzled with organic honey.      Use the Healthy Plate method for lunch and dinner:  1/2 right side of plate non-starchy vegetables.  Bottom left 1/4 plate protein.  Top left 1/4 starch as desired.      Aim for 150 minutes moderate level exercise weekly with 2-3 days strength training.    Add Magnesium glycinate 200 to 500 mg/day for sleep.   Life Extension. NOW. Garden of Life. Whole Food Brand. [employer]. Pure Encapsulations.     Or consider Natural Calm.   by Natural Vitality  Follow dosage  instructions.  Start 1 teaspoon and increase up to 3 teaspoons as needed for sleep.

## 2024-10-11 DIAGNOSIS — E66.9 OBESITY (BMI 30-39.9): ICD-10-CM

## 2024-10-14 RX ORDER — PHENTERMINE HYDROCHLORIDE 37.5 MG/1
37.5 TABLET ORAL
Qty: 30 TABLET | Refills: 3 | Status: SHIPPED | OUTPATIENT
Start: 2024-10-14

## 2025-01-29 ENCOUNTER — CLINICAL ABSTRACT (OUTPATIENT)
Age: 65
End: 2025-01-29

## 2025-06-23 ENCOUNTER — TELEPHONE (OUTPATIENT)
Dept: FAMILY MEDICINE CLINIC | Facility: CLINIC | Age: 65
End: 2025-06-23

## 2025-06-23 DIAGNOSIS — E11.9 TYPE 2 DIABETES MELLITUS WITHOUT COMPLICATION, WITHOUT LONG-TERM CURRENT USE OF INSULIN (HCC): ICD-10-CM

## 2025-06-26 RX ORDER — PRAVASTATIN SODIUM 20 MG
20 TABLET ORAL NIGHTLY
Qty: 90 TABLET | Refills: 1 | OUTPATIENT
Start: 2025-06-26

## 2025-07-15 DIAGNOSIS — E66.9 OBESITY (BMI 30-39.9): ICD-10-CM

## 2025-07-16 RX ORDER — PHENTERMINE HYDROCHLORIDE 37.5 MG/1
37.5 TABLET ORAL
Qty: 30 TABLET | Refills: 0 | OUTPATIENT
Start: 2025-07-16

## (undated) NOTE — LETTER
11/16/2023              [address] [facility]         Dear Amparo Salas records indicate that the tests ordered for you by Felix Jerez MD  have not been done. If you have, in fact, already completed the tests or you do not wish to have the tests done, please contact our office at [address] [place] [address]. Otherwise, please proceed with the testing. To schedule a test at any [facility], call [facility] at [phone], Monday through Friday between 7:30am to 6pm and on Saturday between 8am and 1pm.   Evening and weekend appointments for your exam are available. Lab Order:    Helicobacter Pylori Breath Test, Adult (Order #[id number]) on 9/19/23 -  Please go to the lab to complete this test. Make sure you do not take a PPI (omeprazole/pantoprazole) or H2 blocker (famotidine) medication for 2 weeks prior to the test as this could result in a false negative result. Also do not eat one hour prior to the test.    H-pylori breath test     This test requires the adult patient (>16years of age) to fast for 1 hour prior to test administration. The patient should not have taken antibiotics, proton pump inhibitors (e.g., Prilosec, Prevacid, Aciphex, Nexium), or bismuth preparations (e.g., Pepto-Bismol) within the previous 14 days. The effect of histamine 2-receptor antogonists (e.g., Axid, Pepcid, Tagamet, Zantac) may reduce urease activity on urea breath tests and should be discontinued for 24-48 hours before the sample is collected. When used to monitor treatment, the test should be performed four weeks after cessation of definitive therapy. The patient should be informed that the Pranactin-Citric drink that will be administered contains phenylalanine. Phenylketonurics restrict dietary phenylalanine.            Sincerely,    Felix Jerez MD  [facility], [address], [place]  [address] [place] [id number]  [phone]

## (undated) NOTE — LETTER
20      Patient: Mingo More  : 1960 Visit date: 2020    Dear Morena Resendez,      I examined your patient in consultation today. She has bilateral hand numbness as well as right middle, ring, and small finger pain.

## (undated) NOTE — LETTER
20      Patient: Lavonne Padron  : 1960 Visit date: 2020    Dear  ***,      I examined your patient in consultation today.     ***    Thank you for your kind referral. If I may answer any questions, please feel free to